# Patient Record
Sex: MALE | Race: WHITE | Employment: OTHER | URBAN - METROPOLITAN AREA
[De-identification: names, ages, dates, MRNs, and addresses within clinical notes are randomized per-mention and may not be internally consistent; named-entity substitution may affect disease eponyms.]

---

## 2020-10-23 ENCOUNTER — HOSPITAL ENCOUNTER (EMERGENCY)
Facility: HOSPITAL | Age: 75
Discharge: HOME | End: 2020-10-24
Attending: EMERGENCY MEDICINE
Payer: MEDICARE

## 2020-10-23 ENCOUNTER — APPOINTMENT (OUTPATIENT)
Dept: CT IMAGING | Facility: HOSPITAL | Age: 75
End: 2020-10-23
Attending: EMERGENCY MEDICINE
Payer: MEDICARE

## 2020-10-23 VITALS
RESPIRATION RATE: 17 BRPM | DIASTOLIC BLOOD PRESSURE: 94 MMHG | OXYGEN SATURATION: 98 % | SYSTOLIC BLOOD PRESSURE: 168 MMHG | BODY MASS INDEX: 32.49 KG/M2 | HEART RATE: 64 BPM | TEMPERATURE: 98 F | HEIGHT: 65 IN | WEIGHT: 195 LBS

## 2020-10-23 DIAGNOSIS — R10.13 ABDOMINAL PAIN, EPIGASTRIC: Primary | ICD-10-CM

## 2020-10-23 PROCEDURE — 83690 ASSAY OF LIPASE: CPT | Performed by: EMERGENCY MEDICINE

## 2020-10-23 PROCEDURE — 99285 EMERGENCY DEPT VISIT HI MDM: CPT

## 2020-10-23 PROCEDURE — S0028 INJECTION, FAMOTIDINE, 20 MG: HCPCS | Performed by: EMERGENCY MEDICINE

## 2020-10-23 PROCEDURE — 80076 HEPATIC FUNCTION PANEL: CPT | Performed by: EMERGENCY MEDICINE

## 2020-10-23 PROCEDURE — 96375 TX/PRO/DX INJ NEW DRUG ADDON: CPT

## 2020-10-23 PROCEDURE — 83605 ASSAY OF LACTIC ACID: CPT | Performed by: EMERGENCY MEDICINE

## 2020-10-23 PROCEDURE — 93005 ELECTROCARDIOGRAM TRACING: CPT

## 2020-10-23 PROCEDURE — 74177 CT ABD & PELVIS W/CONTRAST: CPT | Performed by: EMERGENCY MEDICINE

## 2020-10-23 PROCEDURE — 99284 EMERGENCY DEPT VISIT MOD MDM: CPT

## 2020-10-23 PROCEDURE — 80048 BASIC METABOLIC PNL TOTAL CA: CPT | Performed by: EMERGENCY MEDICINE

## 2020-10-23 PROCEDURE — 80048 BASIC METABOLIC PNL TOTAL CA: CPT

## 2020-10-23 PROCEDURE — 81003 URINALYSIS AUTO W/O SCOPE: CPT | Performed by: EMERGENCY MEDICINE

## 2020-10-23 PROCEDURE — 85025 COMPLETE CBC W/AUTO DIFF WBC: CPT

## 2020-10-23 PROCEDURE — 96374 THER/PROPH/DIAG INJ IV PUSH: CPT

## 2020-10-23 PROCEDURE — 96361 HYDRATE IV INFUSION ADD-ON: CPT

## 2020-10-23 PROCEDURE — 85025 COMPLETE CBC W/AUTO DIFF WBC: CPT | Performed by: EMERGENCY MEDICINE

## 2020-10-23 PROCEDURE — 93010 ELECTROCARDIOGRAM REPORT: CPT | Performed by: EMERGENCY MEDICINE

## 2020-10-23 RX ORDER — MORPHINE SULFATE 4 MG/ML
4 INJECTION, SOLUTION INTRAMUSCULAR; INTRAVENOUS ONCE
Status: COMPLETED | OUTPATIENT
Start: 2020-10-23 | End: 2020-10-23

## 2020-10-23 RX ORDER — FAMOTIDINE 40 MG/1
40 TABLET, FILM COATED ORAL NIGHTLY
Qty: 30 TABLET | Refills: 0 | Status: SHIPPED | OUTPATIENT
Start: 2020-10-23 | End: 2020-11-22

## 2020-10-23 RX ORDER — FAMOTIDINE 10 MG/ML
20 INJECTION, SOLUTION INTRAVENOUS ONCE
Status: COMPLETED | OUTPATIENT
Start: 2020-10-23 | End: 2020-10-23

## 2020-10-24 NOTE — ED NOTES
Assumed care to this pt who came in ambulatory to room 42 from triage for complaints of mid abdominal pain that radiates to the back started last night. Pt denies N/V/D. Pain scale at 8-9/10 pain scale. Pt is A/O x 4, breathing is non labored.   Will con

## 2020-10-24 NOTE — ED INITIAL ASSESSMENT (HPI)
Patient c/o abdominal and back pain since this morning. States he feels bloated and pain radiates to his right side.

## 2020-10-24 NOTE — ED PROVIDER NOTES
Patient Seen in: [facility] AND [facility] Emergency Department      History   Patient presents with:  Abdominal Pain    Stated Complaint: abdominal pain since this morning     HPI  49-year-old male with history of prostate cancer, hypertension, appendectomy, re Triage Vitals [10/23/20 1956]   BP (!) 161/89   Pulse 63   Resp 18   Temp 97.8 °F (36.6 °C)   Temp src Oral   SpO2 96 %   O2 Device None (Room air)       Current:BP (!) 168/94   Pulse 65   Temp 97.8 °F (36.6 °C) (Oral)   Resp 17   Ht 165.1 cm (5' 5\")   Wt -----------         ------                     CBC W/ DIFFERENTIAL[[id number]]                              Final result                 Please view results for these tests on the individual orders.    RAINBOW DRAW BLUE   RAINBOW DRAW LAVENDER   RAIN [address] [place] [address]  [phone]    In 3 days      We recommend that you schedule follow up care with a primary care provider within the next three months to obtain basic health screening including reassessment of your blood pressure.       Medication

## 2021-03-05 ENCOUNTER — LAB ENCOUNTER (OUTPATIENT)
Dept: LAB | Facility: HOSPITAL | Age: 76
End: 2021-03-05
Attending: INTERNAL MEDICINE
Payer: MEDICARE

## 2021-03-05 DIAGNOSIS — Z01.818 PREOP EXAMINATION: ICD-10-CM

## 2021-03-05 DIAGNOSIS — Z11.59 ENCOUNTER FOR SCREENING FOR OTHER VIRAL DISEASES: ICD-10-CM

## 2021-03-05 LAB — SARS-COV-2 RNA RESP QL NAA+PROBE: NOT DETECTED

## 2021-03-08 ENCOUNTER — HOSPITAL ENCOUNTER (OUTPATIENT)
Dept: GENERAL RADIOLOGY | Facility: HOSPITAL | Age: 76
Discharge: HOME | End: 2021-03-08
Attending: INTERNAL MEDICINE
Payer: MEDICARE

## 2021-03-08 DIAGNOSIS — R47.02 DYSPHASIA: ICD-10-CM

## 2021-03-08 PROCEDURE — 74220 X-RAY XM ESOPHAGUS 1CNTRST: CPT | Performed by: INTERNAL MEDICINE

## 2023-06-20 ENCOUNTER — HOSPITAL ENCOUNTER (OUTPATIENT)
Dept: GENERAL RADIOLOGY | Facility: HOSPITAL | Age: 78
Discharge: HOME | End: 2023-06-20
Attending: INTERNAL MEDICINE
Payer: MEDICARE

## 2023-06-20 DIAGNOSIS — M79.672 LEFT FOOT PAIN: ICD-10-CM

## 2023-06-20 PROCEDURE — 73630 X-RAY EXAM OF FOOT: CPT | Performed by: INTERNAL MEDICINE

## 2023-10-12 ENCOUNTER — OFFICE VISIT (OUTPATIENT)
Dept: PHYSICAL MEDICINE AND REHAB | Facility: CLINIC | Age: 78
End: 2023-10-12
Payer: MEDICARE

## 2023-10-12 ENCOUNTER — HOSPITAL ENCOUNTER (OUTPATIENT)
Dept: GENERAL RADIOLOGY | Facility: HOSPITAL | Age: 78
Discharge: HOME | End: 2023-10-12
Attending: PHYSICAL MEDICINE & REHABILITATION
Payer: MEDICARE

## 2023-10-12 VITALS
HEIGHT: 65 IN | WEIGHT: 200 LBS | BODY MASS INDEX: 33.32 KG/M2 | SYSTOLIC BLOOD PRESSURE: 120 MMHG | DIASTOLIC BLOOD PRESSURE: 70 MMHG

## 2023-10-12 DIAGNOSIS — M15.9 GENERALIZED OSTEOARTHRITIS: ICD-10-CM

## 2023-10-12 DIAGNOSIS — M15.9 GENERALIZED OSTEOARTHRITIS: Primary | ICD-10-CM

## 2023-10-12 PROCEDURE — 1159F MED LIST DOCD IN RCRD: CPT | Performed by: PHYSICAL MEDICINE & REHABILITATION

## 2023-10-12 PROCEDURE — 3078F DIAST BP <80 MM HG: CPT | Performed by: PHYSICAL MEDICINE & REHABILITATION

## 2023-10-12 PROCEDURE — 3074F SYST BP LT 130 MM HG: CPT | Performed by: PHYSICAL MEDICINE & REHABILITATION

## 2023-10-12 PROCEDURE — 3008F BODY MASS INDEX DOCD: CPT | Performed by: PHYSICAL MEDICINE & REHABILITATION

## 2023-10-12 PROCEDURE — 1160F RVW MEDS BY RX/DR IN RCRD: CPT | Performed by: PHYSICAL MEDICINE & REHABILITATION

## 2023-10-12 PROCEDURE — 73030 X-RAY EXAM OF SHOULDER: CPT | Performed by: PHYSICAL MEDICINE & REHABILITATION

## 2023-10-12 PROCEDURE — 99204 OFFICE O/P NEW MOD 45 MIN: CPT | Performed by: PHYSICAL MEDICINE & REHABILITATION

## 2023-10-12 RX ORDER — MELOXICAM 15 MG/1
TABLET ORAL
Qty: 30 TABLET | Refills: 0 | Status: SHIPPED | OUTPATIENT
Start: 2023-10-12

## 2023-10-17 ENCOUNTER — HOSPITAL ENCOUNTER (EMERGENCY)
Facility: HOSPITAL | Age: 78
Discharge: HOME | End: 2023-10-18
Attending: EMERGENCY MEDICINE
Payer: MEDICARE

## 2023-10-17 DIAGNOSIS — R10.9 ACUTE LEFT FLANK PAIN: Primary | ICD-10-CM

## 2023-10-17 PROCEDURE — 99285 EMERGENCY DEPT VISIT HI MDM: CPT

## 2023-10-17 PROCEDURE — 96374 THER/PROPH/DIAG INJ IV PUSH: CPT

## 2023-10-17 PROCEDURE — 81003 URINALYSIS AUTO W/O SCOPE: CPT | Performed by: EMERGENCY MEDICINE

## 2023-10-18 ENCOUNTER — APPOINTMENT (OUTPATIENT)
Dept: ULTRASOUND IMAGING | Facility: HOSPITAL | Age: 78
End: 2023-10-18
Attending: EMERGENCY MEDICINE
Payer: MEDICARE

## 2023-10-18 ENCOUNTER — APPOINTMENT (OUTPATIENT)
Dept: CT IMAGING | Facility: HOSPITAL | Age: 78
End: 2023-10-18
Attending: EMERGENCY MEDICINE
Payer: MEDICARE

## 2023-10-18 VITALS
RESPIRATION RATE: 18 BRPM | TEMPERATURE: 97 F | SYSTOLIC BLOOD PRESSURE: 139 MMHG | DIASTOLIC BLOOD PRESSURE: 90 MMHG | OXYGEN SATURATION: 96 % | HEART RATE: 55 BPM

## 2023-10-18 LAB
ALBUMIN SERPL-MCNC: 3.7 G/DL (ref 3.4–5)
ALBUMIN/GLOB SERPL: 1.3 {RATIO} (ref 1–2)
ALP LIVER SERPL-CCNC: 66 U/L
ALT SERPL-CCNC: 27 U/L
ANION GAP SERPL CALC-SCNC: 7 MMOL/L (ref 0–18)
AST SERPL-CCNC: 15 U/L (ref 15–37)
BASOPHILS # BLD AUTO: 0.04 X10(3) UL (ref 0–0.2)
BASOPHILS NFR BLD AUTO: 0.6 %
BILIRUB SERPL-MCNC: 0.5 MG/DL (ref 0.1–2)
BILIRUB UR QL: NEGATIVE
BUN BLD-MCNC: 16 MG/DL (ref 7–18)
BUN/CREAT SERPL: 19 (ref 10–20)
CALCIUM BLD-MCNC: 8.8 MG/DL (ref 8.5–10.1)
CHLORIDE SERPL-SCNC: 102 MMOL/L (ref 98–112)
CLARITY UR: CLEAR
CO2 SERPL-SCNC: 26 MMOL/L (ref 21–32)
CREAT BLD-MCNC: 0.84 MG/DL
DEPRECATED RDW RBC AUTO: 45.5 FL (ref 35.1–46.3)
EGFRCR SERPLBLD CKD-EPI 2021: 88 ML/MIN/1.73M2 (ref 60–?)
EOSINOPHIL # BLD AUTO: 0.04 X10(3) UL (ref 0–0.7)
EOSINOPHIL NFR BLD AUTO: 0.6 %
ERYTHROCYTE [DISTWIDTH] IN BLOOD BY AUTOMATED COUNT: 13.3 % (ref 11–15)
GLOBULIN PLAS-MCNC: 2.8 G/DL (ref 2.8–4.4)
GLUCOSE BLD-MCNC: 104 MG/DL (ref 70–99)
GLUCOSE UR-MCNC: NORMAL MG/DL
HCT VFR BLD AUTO: 37.5 %
HGB BLD-MCNC: 13.4 G/DL
HGB UR QL STRIP.AUTO: NEGATIVE
IMM GRANULOCYTES # BLD AUTO: 0.02 X10(3) UL (ref 0–1)
IMM GRANULOCYTES NFR BLD: 0.3 %
KETONES UR-MCNC: NEGATIVE MG/DL
LEUKOCYTE ESTERASE UR QL STRIP.AUTO: NEGATIVE
LYMPHOCYTES # BLD AUTO: 1.71 X10(3) UL (ref 1–4)
LYMPHOCYTES NFR BLD AUTO: 27.4 %
MCH RBC QN AUTO: 33.2 PG (ref 26–34)
MCHC RBC AUTO-ENTMCNC: 35.7 G/DL (ref 31–37)
MCV RBC AUTO: 92.8 FL
MONOCYTES # BLD AUTO: 0.61 X10(3) UL (ref 0.1–1)
MONOCYTES NFR BLD AUTO: 9.8 %
NEUTROPHILS # BLD AUTO: 3.81 X10 (3) UL (ref 1.5–7.7)
NEUTROPHILS # BLD AUTO: 3.81 X10(3) UL (ref 1.5–7.7)
NEUTROPHILS NFR BLD AUTO: 61.3 %
NITRITE UR QL STRIP.AUTO: NEGATIVE
OSMOLALITY SERPL CALC.SUM OF ELEC: 281 MOSM/KG (ref 275–295)
PH UR: 6.5 [PH] (ref 5–8)
PLATELET # BLD AUTO: 163 10(3)UL (ref 150–450)
POTASSIUM SERPL-SCNC: 3.7 MMOL/L (ref 3.5–5.1)
PROT SERPL-MCNC: 6.5 G/DL (ref 6.4–8.2)
PROT UR-MCNC: NEGATIVE MG/DL
RBC # BLD AUTO: 4.04 X10(6)UL
SODIUM SERPL-SCNC: 135 MMOL/L (ref 136–145)
SP GR UR STRIP: 1.01 (ref 1–1.03)
UROBILINOGEN UR STRIP-ACNC: NORMAL
WBC # BLD AUTO: 6.2 X10(3) UL (ref 4–11)

## 2023-10-18 PROCEDURE — 93975 VASCULAR STUDY: CPT | Performed by: EMERGENCY MEDICINE

## 2023-10-18 PROCEDURE — 76870 US EXAM SCROTUM: CPT | Performed by: EMERGENCY MEDICINE

## 2023-10-18 PROCEDURE — 80053 COMPREHEN METABOLIC PANEL: CPT | Performed by: EMERGENCY MEDICINE

## 2023-10-18 PROCEDURE — 74177 CT ABD & PELVIS W/CONTRAST: CPT | Performed by: EMERGENCY MEDICINE

## 2023-10-18 PROCEDURE — 85025 COMPLETE CBC W/AUTO DIFF WBC: CPT | Performed by: EMERGENCY MEDICINE

## 2023-10-18 RX ORDER — DICYCLOMINE HCL 20 MG
20 TABLET ORAL 4 TIMES DAILY PRN
Qty: 30 TABLET | Refills: 0 | Status: SHIPPED | OUTPATIENT
Start: 2023-10-18 | End: 2023-11-17

## 2023-10-18 RX ORDER — MORPHINE SULFATE 4 MG/ML
4 INJECTION, SOLUTION INTRAMUSCULAR; INTRAVENOUS ONCE
Status: COMPLETED | OUTPATIENT
Start: 2023-10-18 | End: 2023-10-18

## 2023-10-18 NOTE — ED INITIAL ASSESSMENT (HPI)
Pt presents to ed with c/o  left sided abdominal pain that radiates down to his penis that started two hours ago. Denies n/v/d. Pt states the pain started out of nowhere. Pt daughter states she is able to translate, interp declined.

## 2023-10-28 ENCOUNTER — TELEPHONE (OUTPATIENT)
Dept: PHYSICAL MEDICINE AND REHAB | Facility: CLINIC | Age: 78
End: 2023-10-28

## 2023-10-28 NOTE — TELEPHONE ENCOUNTER
----- Message from Son Kimbrough DO sent at 10/17/2023  6:37 AM CDT -----  Xray reviewed, shoulder joint appears normal. Continue with meloxicam and keep f/u appointment.

## 2023-10-28 NOTE — TELEPHONE ENCOUNTER
Called patient used help with translate to speak to patient. Notified him about the xray results and to keep appointment with Doctor Anna Keller. Patient thanked for the call.

## 2023-11-13 ENCOUNTER — OFFICE VISIT (OUTPATIENT)
Dept: PHYSICAL MEDICINE AND REHAB | Facility: CLINIC | Age: 78
End: 2023-11-13
Payer: MEDICARE

## 2023-11-13 VITALS — OXYGEN SATURATION: 98 % | HEART RATE: 59 BPM | HEIGHT: 65 IN | BODY MASS INDEX: 33.32 KG/M2 | WEIGHT: 200 LBS

## 2023-11-13 DIAGNOSIS — M15.9 GENERALIZED OSTEOARTHRITIS: Primary | ICD-10-CM

## 2023-11-13 DIAGNOSIS — M79.18 MYOFASCIAL PAIN: ICD-10-CM

## 2023-11-13 PROCEDURE — 3008F BODY MASS INDEX DOCD: CPT | Performed by: PHYSICAL MEDICINE & REHABILITATION

## 2023-11-13 PROCEDURE — 1126F AMNT PAIN NOTED NONE PRSNT: CPT | Performed by: PHYSICAL MEDICINE & REHABILITATION

## 2023-11-13 PROCEDURE — 1159F MED LIST DOCD IN RCRD: CPT | Performed by: PHYSICAL MEDICINE & REHABILITATION

## 2023-11-13 PROCEDURE — 99214 OFFICE O/P EST MOD 30 MIN: CPT | Performed by: PHYSICAL MEDICINE & REHABILITATION

## 2023-11-13 RX ORDER — MELOXICAM 15 MG/1
15 TABLET ORAL DAILY PRN
Qty: 30 TABLET | Refills: 2 | Status: SHIPPED | OUTPATIENT
Start: 2023-11-13

## 2024-01-02 ENCOUNTER — OFFICE VISIT (OUTPATIENT)
Dept: PHYSICAL MEDICINE AND REHAB | Facility: CLINIC | Age: 79
End: 2024-01-02
Payer: MEDICARE

## 2024-01-02 VITALS — BODY MASS INDEX: 33.32 KG/M2 | OXYGEN SATURATION: 100 % | HEIGHT: 65 IN | WEIGHT: 200 LBS | HEART RATE: 64 BPM

## 2024-01-02 DIAGNOSIS — M15.9 GENERALIZED OSTEOARTHRITIS: ICD-10-CM

## 2024-01-02 DIAGNOSIS — M79.18 MYOFASCIAL PAIN: ICD-10-CM

## 2024-01-02 DIAGNOSIS — M17.0 PRIMARY OSTEOARTHRITIS OF BOTH KNEES: Primary | ICD-10-CM

## 2024-01-02 PROCEDURE — 20611 DRAIN/INJ JOINT/BURSA W/US: CPT | Performed by: PHYSICAL MEDICINE & REHABILITATION

## 2024-01-02 PROCEDURE — 3008F BODY MASS INDEX DOCD: CPT | Performed by: PHYSICAL MEDICINE & REHABILITATION

## 2024-01-02 PROCEDURE — 99214 OFFICE O/P EST MOD 30 MIN: CPT | Performed by: PHYSICAL MEDICINE & REHABILITATION

## 2024-01-02 RX ORDER — TRIAMCINOLONE ACETONIDE 40 MG/ML
40 INJECTION, SUSPENSION INTRA-ARTICULAR; INTRAMUSCULAR ONCE
Status: COMPLETED | OUTPATIENT
Start: 2024-01-02 | End: 2024-01-02

## 2024-01-02 RX ORDER — LIDOCAINE HYDROCHLORIDE 10 MG/ML
3 INJECTION, SOLUTION INFILTRATION; PERINEURAL ONCE
Status: COMPLETED | OUTPATIENT
Start: 2024-01-02 | End: 2024-01-02

## 2024-01-02 NOTE — PROGRESS NOTES
Progress note    C/C:   Chief Complaint   Patient presents with    Follow - Up     LOV 11/13/23 pt is here for a follow up on Generalized osteoarthritis.  Was given injections and states he has gotten injection for a few days. No N/T.  Takes meloxicam to ease the pain. Pain 4/10      HPI: 78 year old male presents for follow up. Has chronic bilateral medial knee pain that worsens with standing and walking, less so with sitting. He walks on a treadmill slowly about 1/2 hour, with mild transient knee pain afterwards. No swelling, mechanical symptoms. He has a known history of osteoarthritis of both knees. Last knee joint injections were about 3 1/2 months ago; he is requesting bilateral knee joint injections. Used to have them done at [place].     He also has right scapular pain, constant. No pain with overhead lifting. Had XR right shoulder    Pertinent allergies:   Allergies   Allergen Reactions    Anesthesia S-I-40 [Kdc:Egg Phospholipids+Sodium Metabisulfite+Soybean Oil+Propofol] OTHER (SEE COMMENTS)     Rash to some anesthesia    Penicillin V SHORTNESS OF BREATH        Physical exam:  Pulse 64   Ht 65\"   Wt 200 lb (90.7 kg)   SpO2 100%   BMI 33.28 kg/m²      bilateral knee exam  Observation:    Range of motion:  Flexion: 110 degrees b/l  Extension: 0 degrees b/l    Palpation:  Medial joint line tenderness: negative b/l  Lateral joint line tenderness: negative b/l  Medial patellar facet tenderness: negative b/l  Lateral patellar facet tenderness: negative b/l    Provocative tests:  Lachman: negative b/l  Valgus and varus stress testing: negative b/l    PE right shoulder exam:    Range of motion:  Forward flexion: 160 degrees  Abduction: 160 degrees  Internal rotation: T7  External rotation: mild restriction to PROM    Imaging: No new imaging to review    Assessment and plan  Primary osteoarthritis of both knees  Myofascial pain, scapular pain    Recommend bilateral knee joint steroid injections; done today, see  separate note for details. Continue meloxicam 15mg qDaily PRN pain. Consider physical therapy, right mid trapezius, rhomboid trigger point injections.     F/u 4 weeks.     Soy Gloria DO  Physical Medicine and Rehabilitation  [facility]

## 2024-01-02 NOTE — PROCEDURES
Dx: primary osteoarthritis of both knees  Procedure: bilateral knee joint steroid injections under US guidance    The patient is here for a bilateral knee injection done under ultrasound guidance.  Under ultrasound guidance the bilateral suprapatellar bursa was identified and a geri was placed on the patient's skin.The skin was cleaned with betadine swabs x3 and anesthetized with ethyl chloride spray.  Then a 25 gauge needle was inserted under ultrasound guidance into the bilateral suprapatellar bursa.  Aspiration was performed and no blood, fluid, or air was aspirated.  The patient was then injected with 4 ml of 1 ml of 40 mg of Kenalog/ml and 3 ml of 1% lidocaine without epinephrine.  The needle was removed and a band aid was applied.  The patient will follow up in 2-3 week(s).    Images were saved of the injections.    Soy Gloria DO  Physical Medicine and Rehabilitation  [facility]

## 2024-01-03 ENCOUNTER — TELEPHONE (OUTPATIENT)
Dept: PHYSICAL MEDICINE AND REHAB | Facility: CLINIC | Age: 79
End: 2024-01-03

## 2024-01-03 NOTE — TELEPHONE ENCOUNTER
Initiated retro authorization for Bilateral knee joint steroid injections under US guidance CPT/[facility] 84805-51,  with [employer]  Status: Approved w/ authorization #[id number] valid 1/2/24-4/30/24      Inj done in office

## 2024-01-30 ENCOUNTER — OFFICE VISIT (OUTPATIENT)
Dept: PHYSICAL MEDICINE AND REHAB | Facility: CLINIC | Age: 79
End: 2024-01-30
Payer: MEDICARE

## 2024-01-30 DIAGNOSIS — M79.18 MYOFASCIAL PAIN: ICD-10-CM

## 2024-01-30 DIAGNOSIS — M17.0 PRIMARY OSTEOARTHRITIS OF BOTH KNEES: Primary | ICD-10-CM

## 2024-01-30 PROBLEM — M25.561 PAIN IN BOTH KNEES: Status: ACTIVE | Noted: 2022-03-31

## 2024-01-30 PROBLEM — M25.519 SHOULDER PAIN: Status: ACTIVE | Noted: 2023-09-22

## 2024-01-30 PROBLEM — M25.511 PAIN IN JOINT OF RIGHT SHOULDER: Status: ACTIVE | Noted: 2023-01-09

## 2024-01-30 PROBLEM — M25.562 PAIN IN BOTH KNEES: Status: ACTIVE | Noted: 2022-03-31

## 2024-01-30 PROCEDURE — 20552 NJX 1/MLT TRIGGER POINT 1/2: CPT | Performed by: PHYSICAL MEDICINE & REHABILITATION

## 2024-01-30 PROCEDURE — 99214 OFFICE O/P EST MOD 30 MIN: CPT | Performed by: PHYSICAL MEDICINE & REHABILITATION

## 2024-01-30 RX ORDER — OMEPRAZOLE 40 MG/1
1 CAPSULE, DELAYED RELEASE ORAL DAILY
COMMUNITY

## 2024-01-30 RX ORDER — LIDOCAINE HYDROCHLORIDE 10 MG/ML
2.5 INJECTION, SOLUTION INFILTRATION; PERINEURAL ONCE
Status: COMPLETED | OUTPATIENT
Start: 2024-01-30 | End: 2024-01-30

## 2024-01-30 RX ORDER — CYCLOBENZAPRINE HCL 5 MG
1 TABLET ORAL NIGHTLY
COMMUNITY

## 2024-01-30 RX ORDER — TRIAMCINOLONE ACETONIDE 40 MG/ML
20 INJECTION, SUSPENSION INTRA-ARTICULAR; INTRAMUSCULAR ONCE
Status: COMPLETED | OUTPATIENT
Start: 2024-01-30 | End: 2024-01-30

## 2024-01-30 RX ORDER — DICYCLOMINE HCL 20 MG
TABLET ORAL
COMMUNITY

## 2024-01-31 ENCOUNTER — TELEPHONE (OUTPATIENT)
Dept: PHYSICAL MEDICINE AND REHAB | Facility: CLINIC | Age: 79
End: 2024-01-31

## 2024-01-31 NOTE — PROGRESS NOTES
Progress note    C/C:   Chief Complaint   Patient presents with    Follow - Up     LOV: 01/02/2024 - bilateral knee CSI. \"They did help a little but not as much as last time.\"   RIGHT posterior shoulder pain. Pt to discuss next plan of care, possible injection. Minimal, constant pain.      HPI: 78-year-old male presents for follow-up.  Not much relief with bilateral knee pain following bilateral knee joint steroid injections under ultrasound guidance.  The left knee in particular has been more painful and more limiting with walking.  He is to take meloxicam 15 mg as needed, about once a week without side effects.    He also continues to have right scapular pain that can be provoked with overhead movements.  No numbness or tingling in the arm or hand, no weakness.  No pain over the shoulder joint.    Pertinent allergies:   Allergies   Allergen Reactions    Anesthesia S-I-40 [Kdc:Egg Phospholipids+Sodium Metabisulfite+Soybean Oil+Propofol] OTHER (SEE COMMENTS)     Rash to some anesthesia    Penicillin V SHORTNESS OF BREATH        Physical exam:  bilateral knee exam  Observation: Appreciable effusions    Range of motion:  Flexion: 110 degrees right, 100 degrees left  Extension: 0 degrees b/l    Palpation:  Medial joint line tenderness: + right, negative left  Lateral joint line tenderness: - right, + left  Medial patellar facet tenderness: negative b/l  Lateral patellar facet tenderness: negative b/l    Provocative tests:  Lachman: negative b/l  Valgus and varus stress testing: negative b/l    Imaging: No new imaging to review    Assessment and plan  Generalized osteoarthritis in multiple joints of the hands, bilateral knees  Myofascial pain syndrome    Recommend left or bilateral durolane or monovisc injections under US guidance; if he decides the right knee is not bothersome enough to warrant further treatment we can defer the right knee injection for a later date. Continue meloxicam intermittently. For the right  scapular pain recommend right rhomboid, mid trapezius trigger point injections; done same day, see separate note for details.     Soy Gloria DO  Physical Medicine and Rehabilitation  [facility]

## 2024-01-31 NOTE — TELEPHONE ENCOUNTER
Initiated authorization for Right rhomboid and mid trapezius trigger point injections CPT/[facility] 55816 () with [employer]  Status: referral Approved w/ authorization #[id number] valid 1/30/24-7/27/24  TPI done in office    And    Initiated authorization for Monovisc or Durolane- bilateral knee joint injections under US guidance  CPT/HCPCS 41952-70, W2576e0,  with at Verna HAMILTON at [facility]  Case #[id number]  Monovisc and Durolane are preferred and authorization is not required per health plan w/ [employer]  Status: referral Approved w/ authorization #[id number] valid 1/30/24-7/27/24

## 2024-01-31 NOTE — PROCEDURES
Procedure note: Trigger point injections  Procedure Diagnosis: Myofascial pain    Summary of Procedure:   Risks and benefits of the procedure were discussed with the patient and consent was obtained. Trigger points were palpated and marked along the right rhomboids and mid/lower trapezius. Using betadine swabs, injection sites were cleaned in sterile fashion. Using a 27 gauge 1 1/2\" needle a total 10ml of 20mg Kenolog diluted into 2.5cc of 1% Lidocaine was injected into the trigger points with 1 ml into each trigger point. There was negative aspiration of heme and no paresthesias were elicited. Patient tolerated the procedure well.

## 2024-02-01 NOTE — TELEPHONE ENCOUNTER
LMTCB to schedule  Durolane- bilateral knee joint injections under US guidance     Durolane or monovisc

## 2024-02-05 NOTE — TELEPHONE ENCOUNTER
LMTCB 3rd attempt    I also called Daughter, Natalee (HIPAA verified) she will call office back to schedule when ready

## 2024-02-15 ENCOUNTER — OFFICE VISIT (OUTPATIENT)
Dept: PHYSICAL MEDICINE AND REHAB | Facility: CLINIC | Age: 79
End: 2024-02-15
Payer: MEDICARE

## 2024-02-15 VITALS — BODY MASS INDEX: 33.32 KG/M2 | WEIGHT: 200 LBS | OXYGEN SATURATION: 99 % | HEART RATE: 66 BPM | HEIGHT: 65 IN

## 2024-02-15 DIAGNOSIS — M17.0 PRIMARY OSTEOARTHRITIS OF BOTH KNEES: Primary | ICD-10-CM

## 2024-02-15 PROCEDURE — 1159F MED LIST DOCD IN RCRD: CPT | Performed by: PHYSICAL MEDICINE & REHABILITATION

## 2024-02-15 PROCEDURE — 1160F RVW MEDS BY RX/DR IN RCRD: CPT | Performed by: PHYSICAL MEDICINE & REHABILITATION

## 2024-02-15 PROCEDURE — 3008F BODY MASS INDEX DOCD: CPT | Performed by: PHYSICAL MEDICINE & REHABILITATION

## 2024-02-15 PROCEDURE — 99213 OFFICE O/P EST LOW 20 MIN: CPT | Performed by: PHYSICAL MEDICINE & REHABILITATION

## 2024-02-15 NOTE — PROGRESS NOTES
Progress note    C/C:   Chief Complaint   Patient presents with    Follow - Up     LOV 01/30/24 pt is here for a follow up on Primary osteoarthritis of both knees. No N/T Currently not taking pain meds.. Pain 2/10      HPI: 78 year old male presents for follow up. Since last visit he has had complete relief of scapular pain. Does continue to have bilateral knee pain, left worse than right, particularly bothersome when ascending stairs, less painful on level ground. Takes meloxicam 1-2 days a week without SE.     Pertinent allergies:   Allergies   Allergen Reactions    Anesthesia S-I-40 [Kdc:Egg Phospholipids+Sodium Metabisulfite+Soybean Oil+Propofol] OTHER (SEE COMMENTS)     Rash to some anesthesia    Penicillin V SHORTNESS OF BREATH        Physical exam:  Pulse 66   Ht 65\"   Wt 200 lb (90.7 kg)   SpO2 99%   BMI 33.28 kg/m²      bilateral knee exam  Observation: No appreciable effusions b/l     Range of motion:  Flexion: 110 degrees right, 100 degrees left  Extension: 0 degrees b/l     Palpation:  Medial joint line tenderness: + right, negative left  Lateral joint line tenderness: - right, + left  Medial patellar facet tenderness: negative b/l  Lateral patellar facet tenderness: negative b/l     Provocative tests:  Lachman: negative b/l  Valgus and varus stress testing: negative b/l    Imaging: No new imaging to review    Assessment and plan  Generalized osteoarthritis in multiple joints of hands, b/l knees  Myofascial pain syndrome, resolved    He is doing well enough where he prefers to hold off on further knee injections for the time being. We did get durolane injections approved and offered him injection today, but he would like to consider this on another date. The knee injections usually wear off for him in 3 1/2 to 4 months; he will follow up in mid-late march and we can consider either repeating steroid injection or doing durolane injections at that time. May continue meloxicam 15mg qDaily PRN pain in  the interim.     Soy Gloria DO  Physical Medicine and Rehabilitation  [facility]

## 2024-03-19 ENCOUNTER — OFFICE VISIT (OUTPATIENT)
Dept: PHYSICAL MEDICINE AND REHAB | Facility: CLINIC | Age: 79
End: 2024-03-19
Payer: MEDICARE

## 2024-03-19 VITALS — OXYGEN SATURATION: 100 % | BODY MASS INDEX: 34 KG/M2 | HEART RATE: 62 BPM | WEIGHT: 205 LBS

## 2024-03-19 DIAGNOSIS — M17.0 PRIMARY OSTEOARTHRITIS OF BOTH KNEES: Primary | ICD-10-CM

## 2024-03-19 PROCEDURE — 1125F AMNT PAIN NOTED PAIN PRSNT: CPT | Performed by: PHYSICAL MEDICINE & REHABILITATION

## 2024-03-19 PROCEDURE — 1159F MED LIST DOCD IN RCRD: CPT | Performed by: PHYSICAL MEDICINE & REHABILITATION

## 2024-03-19 PROCEDURE — 1160F RVW MEDS BY RX/DR IN RCRD: CPT | Performed by: PHYSICAL MEDICINE & REHABILITATION

## 2024-03-19 PROCEDURE — 20611 DRAIN/INJ JOINT/BURSA W/US: CPT | Performed by: PHYSICAL MEDICINE & REHABILITATION

## 2024-03-19 RX ORDER — LIDOCAINE HYDROCHLORIDE 10 MG/ML
4 INJECTION, SOLUTION INFILTRATION; PERINEURAL ONCE
Status: COMPLETED | OUTPATIENT
Start: 2024-03-19 | End: 2024-03-19

## 2024-03-19 NOTE — PROCEDURES
Dx: primary osteoarthritis of both knees  Procedure: bilateral durolane knee joint steroid injections under US guidance    The patient is here for a bilateral knee injection done under ultrasound guidance.  Under ultrasound guidance the bilateral suprapatellar bursa was identified and a geri was placed on the patient's skin.The skin was cleaned with betadine swabs x3 and anesthetized with 2ml of 1% PF lidocaine without epinephrine. Then a 22 gauge needle was inserted under ultrasound guidance into the bilateral suprapatellar bursa.  Aspiration was performed and no blood, fluid, or air was aspirated.  The patient was then injected with durolane.  The needle was removed and a band aid was applied.     Images were saved of the injections.

## 2024-05-28 ENCOUNTER — OFFICE VISIT (OUTPATIENT)
Dept: PHYSICAL MEDICINE AND REHAB | Facility: CLINIC | Age: 79
End: 2024-05-28

## 2024-05-28 VITALS
WEIGHT: 205 LBS | OXYGEN SATURATION: 98 % | HEIGHT: 65 IN | RESPIRATION RATE: 18 BRPM | HEART RATE: 62 BPM | BODY MASS INDEX: 34.16 KG/M2

## 2024-05-28 DIAGNOSIS — M17.0 PRIMARY OSTEOARTHRITIS OF BOTH KNEES: Primary | ICD-10-CM

## 2024-05-28 DIAGNOSIS — M15.9 GENERALIZED OSTEOARTHRITIS: ICD-10-CM

## 2024-05-28 PROCEDURE — 3008F BODY MASS INDEX DOCD: CPT | Performed by: PHYSICAL MEDICINE & REHABILITATION

## 2024-05-28 PROCEDURE — 1160F RVW MEDS BY RX/DR IN RCRD: CPT | Performed by: PHYSICAL MEDICINE & REHABILITATION

## 2024-05-28 PROCEDURE — 1159F MED LIST DOCD IN RCRD: CPT | Performed by: PHYSICAL MEDICINE & REHABILITATION

## 2024-05-28 PROCEDURE — 20611 DRAIN/INJ JOINT/BURSA W/US: CPT | Performed by: PHYSICAL MEDICINE & REHABILITATION

## 2024-05-28 PROCEDURE — 99214 OFFICE O/P EST MOD 30 MIN: CPT | Performed by: PHYSICAL MEDICINE & REHABILITATION

## 2024-05-28 RX ORDER — LIDOCAINE HYDROCHLORIDE 10 MG/ML
6 INJECTION, SOLUTION INFILTRATION; PERINEURAL ONCE
Status: COMPLETED | OUTPATIENT
Start: 2024-05-28 | End: 2024-05-28

## 2024-05-28 RX ORDER — TRIAMCINOLONE ACETONIDE 40 MG/ML
80 INJECTION, SUSPENSION INTRA-ARTICULAR; INTRAMUSCULAR ONCE
Status: COMPLETED | OUTPATIENT
Start: 2024-05-28 | End: 2024-05-28

## 2024-05-28 RX ORDER — MELOXICAM 15 MG/1
15 TABLET ORAL DAILY PRN
Qty: 30 TABLET | Refills: 2 | Status: SHIPPED | OUTPATIENT
Start: 2024-05-28

## 2024-05-28 NOTE — PROGRESS NOTES
Progress note    C/C:   Chief Complaint   Patient presents with    Follow - Up     Pt is F/U after bilateral gel injection of the knees, Pt states that he is able to walk due to the injection, states that he is still having pain every day, Denies N.T. States that it is hard to keep weight on knees, Pain 5/10      HPI: 78 year old male presents for follow up. Some improvement with gel injection; able to walk where he could not walk much earlier, though still having bilateral knee pain with walking, left worse than right. Takes meloxicam daily about 1-2 times a week; was told by his PCP he can take the medication up to every other day but is afraid of side effects it may cause if he takes the medication regularly.     Pertinent allergies:   Allergies   Allergen Reactions    Anesthesia S-I-40 [Kdc:Egg Phospholipids+Sodium Metabisulfite+Soybean Oil+Propofol] OTHER (SEE COMMENTS)     Rash to some anesthesia    Penicillin V SHORTNESS OF BREATH        Physical exam:  Pulse 62   Resp 18   Ht 65\"   Wt 205 lb (93 kg)   SpO2 98%   BMI 34.11 kg/m²      bilateral knee exam  Observation: hypertrophy of both knees, left much moreso than right    Range of motion:  Flexion: 110 degrees right, 100 degrees left  Extension: 0 degrees b/l    Palpation:  Medial joint line tenderness: negative b/l  Lateral joint line tenderness: negative b/l  Medial patellar facet tenderness: negative b/l  Lateral patellar facet tenderness: negative b/l    Provocative tests:  Lachman: negative b/l  Valgus and varus stress testing: negative b/l    Imaging: No new imaging to review    Assessment and plan  Primary osteoarthritis of both knees  Prostate CA  Gout  HTN    We discussed either having him take meloxicam 15mg every other day for a week or two, or doing bilateral knee joint steroid injections under US guidance. He does not want to take the medication more often, and elects for bilateral knee joint steroid injections under US guidance. Done  today, see separate note for details.     F/u 3-4 months.     Soy Gloria DO  Physical Medicine and Rehabilitation  [facility]

## 2024-05-28 NOTE — PROCEDURES
Dx: primary osteoarthritis of both knees  Procedure: bilateral knee joint steroid injections under US guidance    The patient is here for a bilateral knee injection done under ultrasound guidance.  Under ultrasound guidance the bilateral suprapatellar bursa was identified and a geri was placed on the patient's skin.The skin was cleaned with betadine swabs x3 and anesthetized with ethyl chloride spray.  Then a 22 gauge needle was inserted under ultrasound guidance into the bilateral suprapatellar bursa.  Aspiration was performed and no blood, fluid, or air was aspirated.  The patient was then injected with 4 ml of 1 ml of 40 mg of Kenalog/ml and 3 ml of 1% lidocaine without epinephrine.  The needle was removed and a band aid was applied.     Images were saved of the injections.    Soy Gloria DO  Physical Medicine and Rehabilitation  [facility]

## 2024-05-29 ENCOUNTER — TELEPHONE (OUTPATIENT)
Dept: PHYSICAL MEDICINE AND REHAB | Facility: CLINIC | Age: 79
End: 2024-05-29

## 2024-05-29 NOTE — TELEPHONE ENCOUNTER
Initiated  Bilateral knee joint steroid injections under US guidance CPT Code: 25467,  & Dx: M17.0 with [employer]    Unable to process, due to referral being needed.

## 2024-06-13 ENCOUNTER — TELEPHONE (OUTPATIENT)
Dept: PHYSICAL MEDICINE AND REHAB | Facility: CLINIC | Age: 79
End: 2024-06-13

## 2024-07-23 ENCOUNTER — HOSPITAL ENCOUNTER (OUTPATIENT)
Dept: MRI IMAGING | Age: 79
Discharge: HOME | End: 2024-07-23
Attending: INTERNAL MEDICINE
Payer: MEDICARE

## 2024-07-23 DIAGNOSIS — R51.9 HEADACHE, UNSPECIFIED: ICD-10-CM

## 2024-07-23 PROCEDURE — 70551 MRI BRAIN STEM W/O DYE: CPT | Performed by: INTERNAL MEDICINE

## 2024-09-03 ENCOUNTER — TELEPHONE (OUTPATIENT)
Dept: PHYSICAL MEDICINE AND REHAB | Facility: CLINIC | Age: 79
End: 2024-09-03

## 2024-09-03 ENCOUNTER — OFFICE VISIT (OUTPATIENT)
Dept: PHYSICAL MEDICINE AND REHAB | Facility: CLINIC | Age: 79
End: 2024-09-03
Payer: MEDICARE

## 2024-09-03 DIAGNOSIS — M17.0 PRIMARY OSTEOARTHRITIS OF BOTH KNEES: Primary | ICD-10-CM

## 2024-09-03 PROCEDURE — 1160F RVW MEDS BY RX/DR IN RCRD: CPT | Performed by: PHYSICAL MEDICINE & REHABILITATION

## 2024-09-03 PROCEDURE — 20611 DRAIN/INJ JOINT/BURSA W/US: CPT | Performed by: PHYSICAL MEDICINE & REHABILITATION

## 2024-09-03 PROCEDURE — 1159F MED LIST DOCD IN RCRD: CPT | Performed by: PHYSICAL MEDICINE & REHABILITATION

## 2024-09-03 RX ORDER — TRIAMCINOLONE ACETONIDE 40 MG/ML
80 INJECTION, SUSPENSION INTRA-ARTICULAR; INTRAMUSCULAR ONCE
Status: COMPLETED | OUTPATIENT
Start: 2024-09-03 | End: 2024-09-03

## 2024-09-03 RX ORDER — LIDOCAINE HYDROCHLORIDE 10 MG/ML
6 INJECTION, SOLUTION INFILTRATION; PERINEURAL ONCE
Status: COMPLETED | OUTPATIENT
Start: 2024-09-03 | End: 2024-09-03

## 2024-09-03 NOTE — PROCEDURES
Worsening bilateral knee pain, left worse than the right, with crepitance of the left knee.  Range of motion reasonably well-maintained.  Knee flexion 110 degrees bilaterally, endrange pain bilaterally.  Extension 0 degrees bilaterally.  No medial or lateral joint and tenderness.  Takes meloxicam 15 mg daily on an as-needed basis, not every day.  Will go ahead and do bilateral knee joint steroid injections today, see below for details.    Dx: primary osteoarthritis of both knees  Procedure: bilateral knee joint steroid injections under US guidance.     The patient is here for a bilateral knee injection done under ultrasound guidance.  Under ultrasound guidance the bilateral suprapatellar bursa was identified and a geri was placed on the patient's skin.The skin was cleaned with betadine swabs x3 and anesthetized with ethyl chloride spray.  Then a 22 gauge needle was inserted under ultrasound guidance into the bilateral suprapatellar bursa.  Aspiration was performed and no blood, fluid, or air was aspirated.  The patient was then injected with 4 ml of 1 ml of 40 mg of Kenalog/ml and 3 ml of 1% lidocaine without epinephrine.  The needle was removed and a band aid was applied.  The patient will follow up in 3-4 week(s).    Images were saved of the injections.    Soy Gloria DO  Physical Medicine and Rehabilitation  [facility]

## 2024-09-03 NOTE — TELEPHONE ENCOUNTER
Initiated authorization for Bilateral knee joint steroid injections under US guidance. CPT/[facility]S 71625-27,  x's 2 dx:M17.0 with [employer]  Completed in the office today.    Status: Approved-Authorization is not required based on medical necessity when being performed, however is not a guarantee of payment and may be subject to review once claim is submitted-Covered Benefit

## 2024-12-19 ENCOUNTER — OFFICE VISIT (OUTPATIENT)
Dept: PHYSICAL MEDICINE AND REHAB | Facility: CLINIC | Age: 79
End: 2024-12-19
Payer: MEDICARE

## 2024-12-19 ENCOUNTER — TELEPHONE (OUTPATIENT)
Dept: PHYSICAL MEDICINE AND REHAB | Facility: CLINIC | Age: 79
End: 2024-12-19

## 2024-12-19 VITALS — BODY MASS INDEX: 34 KG/M2 | HEIGHT: 65 IN

## 2024-12-19 DIAGNOSIS — M15.9 GENERALIZED OSTEOARTHRITIS: ICD-10-CM

## 2024-12-19 DIAGNOSIS — M17.0 PRIMARY OSTEOARTHRITIS OF BOTH KNEES: Primary | ICD-10-CM

## 2024-12-19 RX ORDER — MELOXICAM 15 MG/1
15 TABLET ORAL DAILY PRN
Qty: 30 TABLET | Refills: 2 | Status: SHIPPED | OUTPATIENT
Start: 2024-12-19

## 2024-12-19 RX ORDER — LIDOCAINE HYDROCHLORIDE 10 MG/ML
6 INJECTION, SOLUTION INFILTRATION; PERINEURAL ONCE
Status: COMPLETED | OUTPATIENT
Start: 2024-12-19 | End: 2024-12-19

## 2024-12-19 RX ORDER — TRIAMCINOLONE ACETONIDE 40 MG/ML
80 INJECTION, SUSPENSION INTRA-ARTICULAR; INTRAMUSCULAR ONCE
Status: COMPLETED | OUTPATIENT
Start: 2024-12-19 | End: 2024-12-19

## 2024-12-19 NOTE — PROCEDURES
79-year-old male presents for follow-up.  Worsening bilateral knee pain with standing and walking.  Underwent steroid injection in September.  Requesting repeat.  Done today, see below for details.  Takes meloxicam 15 mg daily on an as-needed basis, not every day.  Medication refilled for him today.    Dx: primary osteoarthritis of both knees  Procedure: bilateral knee joint steroid injections under US guidance    The patient is here for a bilateral knee injection done under ultrasound guidance.  Under ultrasound guidance the bilateral suprapatellar bursa was identified and a geri was placed on the patient's skin.The skin was cleaned with betadine swabs x3 and anesthetized with ethyl chloride spray.  Then a 25 gauge needle was inserted under ultrasound guidance into the bilateral suprapatellar bursa.  Aspiration was performed and no blood, fluid, or air was aspirated.  The patient was then injected with 3 ml of 1 ml of 40 mg of Kenalog/ml and 2 ml of 1% lidocaine without epinephrine.  The needle was removed and a band aid was applied.  The patient will follow up in 3-4 months(s).    Images were saved of the injections.    Soy Gloria DO  Physical Medicine and Rehabilitation  [facility]

## 2024-12-19 NOTE — TELEPHONE ENCOUNTER
Initiated authorization for Bilateral knee joint steroid injections under US guidance. CPT/HCPCS 66087-13,  x's 2 dx:M15.9 with [employer].  Completed in the office today.    Status: Approved - no auth required    Authorization is not required based on medical necessity, however, is not a guarantee of payment and may be subject to review once claim is submitted-Covered Benefit.

## 2024-12-19 NOTE — PROGRESS NOTES
Progress note    C/C:   Chief Complaint   Patient presents with    Follow - Up     LOV 9/3/24 for bilateral knee joint steroid injections under US guidance. The injections helped for a while but the improvement wore off. Pain is low currently. Admits N/T. Denies weakness. Pt can't remember his pain medications. No tx.       HPI: ***    Pertinent allergies: Allergies[1]     Physical exam:  Ht 65\"   BMI 34.11 kg/m²      ***    Imaging: ***    Assessment and plan  ***       [1]   Allergies  Allergen Reactions    Anesthesia S-I-40 [Kdc:Egg Phospholipids+Sodium Metabisulfite+Soybean Oil+Propofol] OTHER (SEE COMMENTS)     Rash to some anesthesia    Penicillin V SHORTNESS OF BREATH

## 2025-04-15 NOTE — PROCEDURES
Dx: Primary osteoarthritis of both knees  Procedure: bilateral knee joint steroid injections under US guidance    The patient is here for a bilateral knee injection done under ultrasound guidance.  Under ultrasound guidance the bilateral suprapatellar bursa was identified and a geri was placed on the patient's skin.The skin was cleaned with betadine swabs x3 and anesthetized with ethyl chloride spray.  Then a 25 gauge needle was inserted under ultrasound guidance into the bilateral suprapatellar bursa.  Aspiration was performed and no blood, fluid, or air was aspirated.  The patient was then injected with 3 ml of 1 ml of 40 mg of Kenalog/ml and 2 ml of 1% lidocaine without epinephrine.  The needle was removed and a band aid was applied.  The patient will follow up in 2-3 week(s).    Images were saved of the injections.    Soy Gloria DO  Physical Medicine and Rehabilitation  [facility]

## 2025-04-15 NOTE — TELEPHONE ENCOUNTER
Initiated authorization for Bilateral knee joint steroid injections under US guidance. CPT/HCPCS 17420-65,  x's 2 dx:M17.0 with [employer] portal.  Completed in the office today.    Status: No auth required    Authorization is not required based on medical necessity, however, is not a guarantee of payment and may be subject to review once claim is submitted.

## 2025-04-15 NOTE — PROGRESS NOTES
The following individual(s) verbally consented to be recorded using ambient AI listening technology and understand that they can each withdraw their consent to this listening technology at any point by asking the clinician to turn off or pause the recording:    Patient name: Sarai Drake

## 2025-04-15 NOTE — PROGRESS NOTES
Progress note    C/C:   Chief Complaint   Patient presents with    Follow - Up     LOV 12/19/2024. Pt is here for f/u on BL knee joint CSI completed 12/19/24 and reports no improvement. Pain is 10/10. Admits N/T and weakness. Denies antibiotics.       HPI: 79 year old male presents for follow up. Worsening bilateral knee pain, diffculty walking. Over the past 1-2 weeks has also had right buttock and lateral hip pain with radiation along the lateral thigh to the knee. No n/t. No previous right hip or low back disorders.     Pertinent allergies: Allergies[1]     Physical exam:  /80 (BP Location: Right arm, Patient Position: Sitting, Cuff Size: large)   Pulse 66   Ht 65\"   Wt 205 lb (93 kg)   SpO2 99%   BMI 34.11 kg/m²      Right hip exam:    Hip flexion: 110 degrees  Hip adduction: 10 degrees  Hip abduction: 30 degrees, no pain  Limitation to internal rotation with provocation of pain: +  Straight leg raise: -    Imaging: no new imaging to review    Assessment and plan  Right buttock/lateral hip pain. Right hip OA vs lumbar radiculopathy  Primary osteoarthritis of both knees    Recommend x-rays of the right hip to include weightbearing views; has been sometime since he has had imaging of the knees, I also ordered x-rays of both knees as well.  This should also include weightbearing views.  We went ahead and repeated bilateral knee joint steroid injections under ultrasound guidance; see separate note for details.    If the right buttock/thigh pain persists he should follow up again in 2-3 weeks; otherwise I will see him in 3 months to repeat the knee joint steroid injections under US guidance if needed.     Soy Gloria DO  Physical Medicine and Rehabilitation  [facility]       [1]   Allergies  Allergen Reactions    Anesthesia S-I-40 [Kdc:Egg Phospholipids+Sodium Metabisulfite+Soybean Oil+Propofol] OTHER (SEE COMMENTS)     Rash to some anesthesia    Penicillin V SHORTNESS OF BREATH

## 2025-04-22 NOTE — TELEPHONE ENCOUNTER
Per Dr Gloria \"Xrays reviewed, quite mild findings of osteoarthritis in the right hip. The xrays of the knees were not done; please have him do that sometime prior to next visit. Would be best to relay this with    or by Italian speaking staff. \"    Language Line services used  : Chung  ID# [id number]    Patient was informed on Dr Gloria's recommendations. No further questions at this time. Closing the encounter.

## 2025-05-06 NOTE — TELEPHONE ENCOUNTER
Initiated authorization for Right trochanteric bursa steroid injection under US guidance. CPT/HCPCS 51327,  dx:M70.61 with [employer] portal.    Status: No auth required    Authorization is not required based on medical necessity, however, is not a guarantee of payment and may be subject to review once claim is submitted.

## 2025-05-06 NOTE — PROGRESS NOTES
The following individual(s) verbally consented to be recorded using ambient AI listening technology and understand that they can each withdraw their consent to this listening technology at any point by asking the clinician to turn off or pause the recording:    Patient name: Jose M Drake    Progress note    C/C:   Chief Complaint   Patient presents with    Follow - Up     LOV 4/15/2025 F/U bilateral knee injection done under ultrasound guidance.patient reports some improvement since injections no N/T c/o pain and weakness takes Meloxicam 15 MG Oral for pain         History of Present Illness  Jose M Drake is an 79-year-old male who presents with hip pain.    He experiences pain in the hip, particularly the right buttock and lateral hip, which worsens when sitting in a car. The pain is less severe when standing or walking but becomes bothersome when sitting. A burning sensation is present in the back of the leg and thigh, specifically in the thick part of the leg rather than the bone.    He uses meloxicam for his knees, taking it every other day due to concerns about stomach issues. No numbness or tingling in the back or leg.    Pertinent allergies: Allergies[1]     Physical exam:  /80   Pulse 63   Resp 16   Ht 65\"   Wt 174 lb 9.6 oz (79.2 kg)   SpO2 97%   BMI 29.05 kg/m²      Right hip exam:    Hip flexion: 110 degrees  Hip adduction: 10 degrees  Hip abduction: 40 degrees  Hip scour: mildly painful  Straight leg raise: -    IMAGING: X-ray right hip dated 4/15/2025 independently reviewed, as was report.  He has very little findings of osteoarthritis of the right hip.  Assessment & Plan  Right trochanteric bursitis, vs lumbar radiculitis  Right hip bursitis is causing pain due to bursa inflammation, with X-rays showing minimal osteoarthritis of the hip joint. Physical therapy is not preferred due to knee pain, so a bursa injection is considered as an alternative treatment. Continue  meloxicam every other day to minimize gastrointestinal issues. Recommend right trochanteric bursa steroid injection under US guidance.     Primary osteoarthritis of both knees    He manages knee pain with meloxicam every other day due to gastrointestinal concerns. Continue this regimen to minimize gastrointestinal issues.    Soy Gloria DO  Physical Medicine and Rehabilitation  [facility]         [1]   Allergies  Allergen Reactions    Anesthesia S-I-40 [Kdc:Egg Phospholipids+Sodium Metabisulfite+Soybean Oil+Propofol] OTHER (SEE COMMENTS)     Rash to some anesthesia    Penicillin V SHORTNESS OF BREATH

## 2025-05-08 NOTE — PROCEDURES
Procedure Note: Right trochanteric bursa injection  Diagnosis: right trochanteric bursitis    After consent was obtained, patient was placed on table in the lateral recumbant position with the right side up.  Area of interest was identified under ultrasound guidance, in particular the sub-gluteal bursa between the gluteus chalino and medius insertions on the greater trochanter, and the overlying soft tissue was prepped using iodine swab in a sterile manner.  Next Ethyl Chloride spray was used at needle insertion point to numb skin. Next using a 25 gauge 1 1/2 inch needle the trochanter bursa was approached and injected a total of 40mg Kenolog in 4cc of 1% Lidocaine with negative aspiration for heme and no paresthesias being elicited.  Patient tolerated procedure well. Image was saved of the injection.  Soy Gloria DO  Physical Medicine and Rehabilitation  [facility]

## 2025-07-15 NOTE — TELEPHONE ENCOUNTER
Initiated authorization for Bilateral Knee Corticosteroid Injections under Ultrasound Guidance. CPT/HCPCS 27458-41,  x's 2 dx:M17.0 with [employer] portal.  Complete din the office today.    Status: No auth required    Authorization is not required based on medical necessity, however, is not a guarantee of payment and may be subject to review once claim is submitted.

## 2025-07-16 NOTE — PROCEDURES
80 year old male presents for follow up; right hip much better following right trochanteric bursa injection under US guidance. Recurrent bilateral knee pain without swelling. Has been taking meloxicam 15mg qDaily PRN pain, not every day. Requesting repeat bilateral knee joint steroid injections; done today, see below.     Dx: primary osteoarthritis of both knees  Procedure: bilateral knee joint steroid injection under US guidance    The patient is here for a bilateral knee injection done under ultrasound guidance.  Under ultrasound guidance the bilateral suprapatellar bursa was identified and a geri was placed on the patient's skin.The skin was cleaned with betadine swabs x3 and anesthetized with ethyl chloride spray.  Then a 25 gauge needle was inserted under ultrasound guidance into the bilateral suprapatellar bursa.  Aspiration was performed and no blood, fluid, or air was aspirated.  The patient was then injected with 4 ml of 1 ml of 40 mg of Kenalog/ml and 3 ml of 1% lidocaine without epinephrine.  The needle was removed and a band aid was applied.  The patient will follow up in 3-4(s).    Images were saved of the injections.    Soy Gloria DO  Physical Medicine and Rehabilitation  [facility]

## (undated) NOTE — LETTER
Notifier: Tanja Yorder       Patient Name: Sarai Drake       Identification Number: [id number]                          Advance Beneficiary Notice of Noncoverage (ABN)   NOTE:  If Medicare doesn’t pay for D. Items/service(s) below, you may have to pay.  Medicare does not pay for everything, even some care that you or your health care provider have good reason to think you need. We expect Medicare may not pay for the D. items/service(s) below.  Items or Services Reason Medicare May Not Pay: Estimated Cost   __EKG ($129.00)  __Pap smear ($48.23) __Pelvic/Breast ($65.00)  __ Ear Irrigation ($149)  __ Injection(s)  ___ Tdap ($70)       ___ Meningitis ($206)   __Prevnar ($285)  ___ Td ($51)            ___Shingrix ($215)        __Prevnar 20 ($309)  ___ Hep A ($156)   ___Prolia ($1827.00)     __ Xiaflex ($              )   ___ Hep B ($167)      __Pneumovax ($155)                                            ___ Vaccine Administration ($31)   __ Medicare does not cover this service      __ Medicare may not pay for this   item/service for your condition     __ Medicare may not pay for this item/service as often as this        WHAT YOU NEED TO DO NOW:  Read this notice, so you can make an informed decision about your care.  Ask us any questions that you may have after you finish reading.  Choose an option below about whether to receive the D. item/service(s)  listed above.  Note: If you choose Option 1 or 2, we may help you to use any other insurance that you might have, but Medicare cannot require us to do this.  OPTIONS: Check only one box.  We cannot choose a box for you.   OPTION 1. I want the D. item/service(s) listed above. You may ask to be paid now, but I also want Medicare billed for an official decision on payment, which is sent to me on a Medicare Summary Notice (MSN). I understand that if Medicare doesn’t pay, I am responsible for payment, but I can appeal to Medicare by following the directions on the  MSN. If Medicare does pay, you will refund any payments I made to you, less co-pays or deductibles.  OPTION 2. I want the D. item/service(s) listed above, but do not bill Medicare. You may ask to be paid now as I am responsible for payment. I cannot appeal if Medicare is not billed.  OPTION 3. I don't want the D. item/service(s) listed above. I understand with this choice I am not responsible for payment, and I cannot appeal to see if Medicare would pay.    H. Additional Information:    This notice gives our opinion, not an official Medicare decision. If you have other questions on this notice or Medicare billing, call 1-800-MEDICARE (1-730.878.8617/TTY: [phone]). Signing below means that you have received and understand this notice. You also receive a copy.  Signature: Date:       You have the right to get Medicare information in an accessible format, like large print, Braille, or audio. You also have the right to file a complaint if you feel you’ve been discriminated against. Visit Medicare.gov/about- us/whdianpnhxwwf-svctvqtedqjmyvryt-bzerss.  According to the Paperwork Reduction Act of 1995, no persons are required to respond to a collection of information unless it displays a valid OMB control number. The valid OMB control number for this information collection is 4249-8412. The time required to complete this information collection is estimated to average 7 minutes per response, including the time to review instructions, search existing data resources, gather the data needed, and complete and review the information collection. If you have comments concerning the accuracy of the time estimate or suggestions for improving this form, please write to: CMS, [facility]     Murphy, Attn: YAYA Reports Clearance Officer, [place], [place] [address].  Form CMS-R-131 (Exp. 1/31/2026) Form Approved OMB No. [id number]

## (undated) NOTE — LETTER
Notifier: [facility]       Patient Name: Sarai Drake       Identification Number: [id number]                          Advance Beneficiary Notice of Noncoverage (ABN)   NOTE:  If Medicare doesn’t pay for D. Items/service(s) below, you may have to pay.  Medicare does not pay for everything, even some care that you or your health care provider have good reason to think you need. We expect Medicare may not pay for the D. items/service(s) below.  Items or Services   Bilateral knee joint steroid injections under US guidance    Reason Medicare May Not Pay: Estimated Cost   __EKG ($129.00)  __Pap smear ($48.23) __Pelvic/Breast ($65.00)  __ Ear Irrigation ($149)  _x_ Injection(s)  ___ Tdap ($70)       ___ Meningitis ($206)   __Prevnar ($285)  ___ Td ($51)            ___Shingrix ($215)        __Prevnar 20 ($309)  ___ Hep A ($156)   ___Prolia ($1827.00)     __ Xiaflex ($              )   ___ Hep B ($167)      __Pneumovax ($155)                                            ___ Vaccine Administration ($31)   __ Medicare does not cover this service      __ Medicare may not pay for this   item/service for your condition     __ Medicare may not pay for this item/service as often as this        WHAT YOU NEED TO DO NOW:  Read this notice, so you can make an informed decision about your care.  Ask us any questions that you may have after you finish reading.  Choose an option below about whether to receive the D. item/service(s)  listed above.  Note: If you choose Option 1 or 2, we may help you to use any other insurance that you might have, but Medicare cannot require us to do this.  OPTIONS: Check only one box.  We cannot choose a box for you.   OPTION 1. I want the D. item/service(s) listed above. You may ask to be paid now, but I also want Medicare billed for an official decision on payment, which is sent to me on a Medicare Summary Notice (MSN). I understand that if Medicare doesn’t pay, I am responsible for  payment, but I can appeal to Medicare by following the directions on the MSN. If Medicare does pay, you will refund any payments I made to you, less co-pays or deductibles.  OPTION 2. I want the D. item/service(s) listed above, but do not bill Medicare. You may ask to be paid now as I am responsible for payment. I cannot appeal if Medicare is not billed.  OPTION 3. I don't want the D. item/service(s) listed above. I understand with this choice I am not responsible for payment, and I cannot appeal to see if Medicare would pay.    H. Additional Information:    This notice gives our opinion, not an official Medicare decision. If you have other questions on this notice or Medicare billing, call 1-800-MEDICARE (1-967.125.3594/TTY: [phone]). Signing below means that you have received and understand this notice. You also receive a copy.  Signature: Date:       You have the right to get Medicare information in an accessible format, like large print, Braille, or audio. You also have the right to file a complaint if you feel you’ve been discriminated against. Visit Medicare.gov/about- us/grblmnmdgdzqm-qmpjvzpzxhdponkjk-trhgpa.  According to the Paperwork Reduction Act of 1995, no persons are required to respond to a collection of information unless it displays a valid OMB control number. The valid OMB control number for this information collection is [id number]. The time required to complete this information collection is estimated to average 7 minutes per response, including the time to review instructions, search existing data resources, gather the data needed, and complete and review the information collection. If you have comments concerning the accuracy of the time estimate or suggestions for improving this form, please write to: CMS, [facility] [facility] [employer]     Murphy Attn: YAYA Reports Clearance Officer, [place], [place] [address].  Form CMS-R-131 (Exp. 1/31/2026) Form Approved OMB No. [id number]

## (undated) NOTE — LETTER
AUTHORIZATION FOR SURGICAL OPERATION OR OTHER PROCEDURE    1. I hereby authorize Dr. Soy Gloria and the [facility] staff assigned to my case to perform the following operation and/or procedure at the [facility]:    Bilateral knee joint steroid injections under US guidance     2.  My physician has explained the nature and purpose of the operation or other procedure, possible alternative methods of treatment, the risks involved, and the possibility of complication to me.  I acknowledge that no guarantee has been made as to the result that may be obtained.  3.  I recognize that, during the course of this operation, or other procedure, unforseen conditions may necessitate additional or different procedure than those listed above.  I, therefore, further authorize and request that the above named physician, his/her physician assistants or designees perform such procedures as are, in his/her professional opinion, necessary and desirable.  4.  Any tissue or organs removed in the operation or other procedure may be disposed of by and at the discretion of the [facility] staff and [facility].  5.  I understand that in the event of a medical emergency, I will be transported by local paramedics to [facility] or other hospital emergency department.  6.  I certify that I have read and fully understand the above consent to operation and/or other procedure.    7.  I acknowledge that my physician has explained sedation/analgesia administration to me including the risks and benefits.  I consent to the administration of sedation/analgesia as may be necessary or desirable in the judgement of my physician.    Witness signature: ___________________________________________________ Date:  ______/______/_____                    Time:  ________ A.M.  P.M.       Patient Name:  Sarai Drake  6/3/1945  [id number]         Patient signature:   ___________________________________________________        Statement of Physician  My signature below affirms that prior to the time of the procedure, I have explained to the patient and/or his/her guardian, the risks and benefits involved in the proposed treatment and any reasonable alternative to the proposed treatment.  I have also explained the risks and benefits involved in the refusal of the proposed treatment and have answered the patient's questions.                        Date:  ______/______/_______  Provider                      Signature:  __________________________________________________________       Time:  ___________ AWILMAN FULTON

## (undated) NOTE — LETTER
AUTHORIZATION FOR SURGICAL OPERATION OR OTHER PROCEDURE    1. I hereby authorize Dr. Soy Gloria and the [facility] staff assigned to my case to perform the following operation and/or procedure at the [facility]:    Right trochanteric bursa steroid injection under US guidance       2.  My physician has explained the nature and purpose of the operation or other procedure, possible alternative methods of treatment, the risks involved, and the possibility of complication to me.  I acknowledge that no guarantee has been made as to the result that may be obtained.  3.  I recognize that, during the course of this operation, or other procedure, unforseen conditions may necessitate additional or different procedure than those listed above.  I, therefore, further authorize and request that the above named physician, his/her physician assistants or designees perform such procedures as are, in his/her professional opinion, necessary and desirable.  4.  Any tissue or organs removed in the operation or other procedure may be disposed of by and at the discretion of the [facility] staff and [facility].  5.  I understand that in the event of a medical emergency, I will be transported by local paramedics to [facility] or other hospital emergency department.  6.  I certify that I have read and fully understand the above consent to operation and/or other procedure.    7.  I acknowledge that my physician has explained sedation/analgesia administration to me including the risks and benefits.  I consent to the administration of sedation/analgesia as may be necessary or desirable in the judgement of my physician.    Witness signature: ___________________________________________________ Date:  ______/______/_____                    Time:  ________ A.M.  P.M.       Jose M Escontrias  6/3/1945  [id number]         Patient signature:   ___________________________________________________                Statement of Physician  My signature below affirms that prior to the time of the procedure, I have explained to the patient and/or his/her guardian, the risks and benefits involved in the proposed treatment and any reasonable alternative to the proposed treatment.  I have also explained the risks and benefits involved in the refusal of the proposed treatment and have answered the patient's questions.                        Date:  ______/______/_______  Provider                      Signature:  __________________________________________________________       Time:  ___________ AWILMAN FULTON

## (undated) NOTE — LETTER
AUTHORIZATION FOR SURGICAL OPERATION OR OTHER PROCEDURE    1. I hereby authorize Dr. Soy Gloria and the [facility] staff assigned to my case to perform the following operation and/or procedure at the [facility]:    Bilateral knee joint steroid injections under US guidance     2.  My physician has explained the nature and purpose of the operation or other procedure, possible alternative methods of treatment, the risks involved, and the possibility of complication to me.  I acknowledge that no guarantee has been made as to the result that may be obtained.  3.  I recognize that, during the course of this operation, or other procedure, unforseen conditions may necessitate additional or different procedure than those listed above.  I, therefore, further authorize and request that the above named physician, his/her physician assistants or designees perform such procedures as are, in his/her professional opinion, necessary and desirable.  4.  Any tissue or organs removed in the operation or other procedure may be disposed of by and at the discretion of the [facility] staff and [facility].  5.  I understand that in the event of a medical emergency, I will be transported by local paramedics to [facility] or other hospital emergency department.  6.  I certify that I have read and fully understand the above consent to operation and/or other procedure.    7.  I acknowledge that my physician has explained sedation/analgesia administration to me including the risks and benefits.  I consent to the administration of sedation/analgesia as may be necessary or desirable in the judgement of my physician.    Witness signature: ___________________________________________________ Date:  ______/______/_____                    Time:  ________ A.M.  P.M.       Patient Name:  Sarai Drake  6/3/1945  [id number]         Patient signature:   ___________________________________________________                 Statement of Physician  My signature below affirms that prior to the time of the procedure, I have explained to the patient and/or his/her guardian, the risks and benefits involved in the proposed treatment and any reasonable alternative to the proposed treatment.  I have also explained the risks and benefits involved in the refusal of the proposed treatment and have answered the patient's questions.                        Date:  ______/______/_______  Provider                      Signature:  __________________________________________________________       Time:  ___________ AWILMAN FULTON

## (undated) NOTE — LETTER
[facility] [address] , [place]   Date:   10/12/2023     Name:   Werner Cabot    YOB: 1945   MRN:   [id number]       WHERE IS YOUR PAIN NOW? Pelon the areas on your body where you feel the described sensations. Use the appropriate symbol. Anice Cedar the areas of radiation. Include all affected areas. Just to complete the picture, please draw in the face. ACHE:  ^ ^ ^   NUMBNESS:  0000   PINS & NEEDLES:  = = = =                              ^ ^ ^                       0000              = = = =                                    ^ ^ ^                       0000            = = = =      BURNING:  XXXX   STABBING: ////                  XXXX                ////                         XXXX          ////     Please pelon the line below indicating your degree of pain right now  with 0 being no pain 10 being the worst pain possible.                                          0             1             2              3             4              5              6              7             8             9             10         Patient Signature:

## (undated) NOTE — LETTER
WHERE IS YOUR PAIN NOW?  Geri the areas on your body where you feel the described sensations.  Use the appropriate symbol.  Geri the areas of radiation.  Include all affected areas.  Just to complete the picture, please draw in the face.     ACHE:  ^ ^ ^   NUMBNESS:  0000   PINS & NEEDLES:  = = = =                              ^ ^ ^                       0000              = = = =                                    ^ ^ ^                       0000            = = = =      BURNING:  XXXX   STABBING: ////                  XXXX                ////                         XXXX          ////     Please geri the line below indicating your degree of pain right now  with 0 being no pain 10 being the worst pain possible.                                         0             1             2              3             4              5              6              7             8             9             10         Patient Signature:

## (undated) NOTE — LETTER
AUTHORIZATION FOR SURGICAL OPERATION OR OTHER PROCEDURE    1. I hereby authorize Dr. Soy Gloria and the [facility] staff assigned to my case to perform the following operation and/or procedure at the [facility]:  Bilateral knee joint steroid injections under US guidance   2.  My physician has explained the nature and purpose of the operation or other procedure, possible alternative methods of treatment, the risks involved, and the possibility of complication to me.  I acknowledge that no guarantee has been made as to the result that may be obtained.  3.  I recognize that, during the course of this operation, or other procedure, unforseen conditions may necessitate additional or different procedure than those listed above.  I, therefore, further authorize and request that the above named physician, his/her physician assistants or designees perform such procedures as are, in his/her professional opinion, necessary and desirable.  4.  Any tissue or organs removed in the operation or other procedure may be disposed of by and at the discretion of the [facility] staff and [facility].  5.  I understand that in the event of a medical emergency, I will be transported by local paramedics to [facility] or other hospital emergency department.  6.  I certify that I have read and fully understand the above consent to operation and/or other procedure.    7.  I acknowledge that my physician has explained sedation/analgesia administration to me including the risks and benefits.  I consent to the administration of sedation/analgesia as may be necessary or desirable in the judgement of my physician.    Witness signature: ___________________________________________________ Date:  ______/______/_____                    Time:  ________ A.M.  P.M.   Sarai Drake  [id number]  6/3/1945  Patient signature:  ___________________________________________________             Relationship to  Patient:           []  Parent    Responsible person                          []  Spouse  In case of minor or                    [] Other  _____________   Incompetent name:  __________________________________________________                               (please print)      _____________      Responsible person  In case of minor or  Incompetent signature:  _______________________________________________    Statement of Physician  My signature below affirms that prior to the time of the procedure, I have explained to the patient and/or his/her guardian, the risks and benefits involved in the proposed treatment and any reasonable alternative to the proposed treatment.  I have also explained the risks and benefits involved in the refusal of the proposed treatment and have answered the patient's questions.                        Date:  ______/______/_______  Provider                      Signature:  __________________________________________________________       Time:  ___________ A.M    P.M.

## (undated) NOTE — LETTER
A. Notifier: [employer]. Patient Name: Jose M Drake Identification Number: [id number]  Aviso anticipado de no cobertura al beneficiario (ABN, por bobo siglas   en inglés)   NOTA: Si Medicare no paga por los artículos o servicios a continuación, usted podría tener que pagar. Medicare no paga por todo, incluidos algunos cuidados que usted o villanueva proveedor de atención médica entienda que son necesarios. Se anticipa que Medicare no pague por los artículos o servicios a continuación.  D. Artículos o servicios E. Motivo por el cual Medicare podría katelin el pago: F. Costo estimado   Right trochanteric bursa steroid injection under US guidance      __ Medicare no se cubre jose artículo o servicio      __ No se cubre jose artículo o servicio para villanueva condición     __ Es posible que Medicare no pague por jose artículo o servicio con tanta frecuencia        LO QUE USTED DEBE HACER AHORA:  Shahrzad jose aviso para poder emi edwin decisión informada sobre bobo cuidados.  Háganos las preguntas que tenga después de terminar de leer.  Elija edwin opción a continuación sobre si recibirá los artículos o servicios que se indica arriba.       Nota: Si elige Opción 1 o 2, podríamos ayudarle a utilizar los otros seguros que tenga,        ac Medicare no nos puede obligar a hacer esto.  G. Opciones: Sal solamente edwin samantha. No podemos elegir la samantha para usted.    OPCIÓN 1. Quiero los artículos o servicios que se indica arriba. Pudiera pedir el pago ahora, ac yo también solicito que se facture a Medicare para obtener edwin decisión oficial respecto al pago, la cual me será enviada en un Resumen de Medicare (MSN, por bobo siglas en inglés). Entiendo que, si Medicare no paga, yo seré responsable del pago, ac puedo apelar a Medicare según las indicaciones en el MSN. Si Medicare pagará, me serán reembolsados todos los pagos que yo haya hecho, gisselle los copagos o deducibles.   OPCIÓN 2. Quiero los artículos o  servicios que se indica arriba, ac no  facture a Medicare. Se podrá pedir el pago ahora, ya que yo soy responsable del pago. No podré apelar si no se facturara a Medicare.   OPCIÓN 3. No quiero los artículos o servicios que se indica arriba. Entiendo que, con esta elección, no seré responsable del pago, y no podré apelar para saber si Medicare hubiera pagado.    H. Información adicional:   Toshia aviso explica nuestra opinión y no constituye edwin decisión oficial de Medicare. Si usted tiene otras preguntas relativas a toshia aviso o la facturación de Medicare, llame al 1-800-MEDICARE (7-989-633-1963/TTY: 1-877-486-2048). Firme abajo para reconocer kvng recibido y entendido toshia aviso. Usted también recibirá edwin copia.  SHANNA Cain:   TIMOTEO Carter:       Tiene derecho a obtener información de Medicare en un formato accesible, heather letra isabella, braille o audio. También tiene derecho a presentar edwin queja si siente que ha sido discriminado. Visite Medicare.gov/about-us/mcaxkpkndymsj-opkhubmnfxzghrexp-bmpazn.    De acuerdo con la [employer] para la Reducción de Trámites de 1995, ninguna persona será obligada a responder a edwin recopilación de información a menos que se exhiba un número de control válido de la OMB. El número de control válido de la OMB para esta recopilación de información es [phone]. El tiempo necesario para completar esta recopilación de información es de aproximadamente 7 minutos por respuesta, incluido el tiempo para revisar las instrucciones, buscar salazar de datos existentes, reunir los datos necesarios, y completar y revisar la recopilación de información. Si tiene preguntas sobre la precisión del estimado de tiempo o sugerencias para mejorar toshia formulario, escriba a: CMS, [facility]     Security [place], Attn: YAYA Reports Clearance Officer, [place], [place] [address].    Formulario CMS-R-131 (Exp. 01/31/2026)                                      Formulario aprobado [facility] No. [id number]

## (undated) NOTE — LETTER
A. Notifier: [employer]. Patient Name: Sarai Drake Identification Number: [id number]  Aviso anticipado de no cobertura al beneficiario (ABN, por bobo siglas   en inglés)   NOTA: Si Medicare no paga por los artículos o servicios a continuación, usted podría tener que pagar. Medicare no paga por todo, incluidos algunos cuidados que usted o villanueva proveedor de atención médica entienda que son necesarios. Se anticipa que Medicare no pague por los artículos o servicios a continuación.  D. Artículos o servicios  Bilateral knee joint steroid injections under US guidance  E. Motivo por el cual Medicare podría katelin el pago: F. Costo estimado   __ EKG ($87.00)  __ Pap smear ($101) __Pelvic/Breast ($147.00)  __ Ear Irrigation ($138)  _x_ Injection(s)  ___ Tdap ($181)       ___ Meningitis ($290)   __Prevnar ($555)  ___ Td ($66)              ___ Prevnar 20 ($549)  ___ Hep A ($152)     ___ Prolia ($1827)         __ Xiaflex ($         )   ___ Hep B ($150)     ___ Pneumovax($287)                                     ___ Vaccine Administration ($65)   __ Medicare no se cubre jose artículo o servicio      __ No se cubre jose artículo o servicio para villanueva condición     __ Es posible que Medicare no pague por jose artículo o servicio con tanta frecuencia        LO QUE USTED DEBE HACER AHORA:  Shahrzad jose aviso para poder emi edwin decisión informada sobre bobo cuidados.  Háganos las preguntas que tenga después de terminar de leer.  Elija edwin opción a continuación sobre si recibirá los artículos o servicios que se indica arriba.       Nota: Si elige Opción 1 o 2, podríamos ayudarle a utilizar los otros seguros que tenga,        ac Medicare no nos puede obligar a hacer esto.  G. Opciones: Sal solamente edwin samantha. No podemos elegir la samantha para usted.    OPCIÓN 1. Quiero los artículos o servicios que se indica arriba. Pudiera pedir el pago ahora, ac yo también solicito que se facture a Medicare para  obtener edwin decisión oficial respecto al pago, la cual me será enviada en un Resumen de Medicare (MSN, por bobo siglas en inglés). Entiendo que, si Medicare no paga, yo seré responsable del pago, ac puedo apelar a Medicare según las indicaciones en el MSN. Si Medicare pagará, me serán reembolsados todos los pagos que yo haya hecho, gisselle los copagos o deducibles.   OPCIÓN 2. Quiero los artículos o servicios que se indica arriba, ac no  facture a Medicare. Se podrá pedir el pago ahora, ya que yo soy responsable del pago. No podré apelar si no se facturara a Medicare.   OPCIÓN 3. No quiero los artículos o servicios que se indica arriba. Entiendo que, con esta elección, no seré responsable del pago, y no podré apelar para saber si Medicare hubiera pagado.    H. Información adicional:   Jose aviso explica nuestra opinión y no constituye edwin decisión oficial de Medicare. Si usted tiene otras preguntas relativas a jose aviso o la facturación de Medicare, llame al 1-800-MEDICARE ([phone]/TTY: 1-877-486-2048). Firme abajo para reconocer kvng recibido y entendido jose aviso. Usted también recibirá edwin copia.  IUnique Cain:   TIMOTEO Carter:       Tiene derecho a obtener información de Medicare en un formato accesible, heather letra isabella, braille o audio. También tiene derecho a presentar edwin queja si siente que ha sido discriminado. Visite Medicare.gov/about-us/iibfwypxupaht-lowztidsuxixxrohx-pndffk.    De acuerdo con la Beatriz para la Reducción de Trámites de 1995, ninguna persona será obligada a responder a edwin recopilación de información a menos que se exhiba un número de control válido de la OMB. El número de control válido de la OMB para esta recopilación de información es [phone]. El tiempo necesario para completar esta recopilación de información es de aproximadamente 7 minutos por respuesta, incluido el tiempo para revisar las instrucciones, buscar salazar de datos existentes, reunir los datos necesarios, y completar  y revisar la recopilación de información. Si tiene preguntas sobre la precisión del estimado de tiempo o sugerencias para mejorar jose formulario, escriba a: CMS, [facility]     Security [place], Attn: [facility] Reports Clearance Officer, [place], [place] [address].    Formulario CMS-R-131 (Exp. 01/31/2026)                                      Formulario aprobado [facility] No. [id number]

## (undated) NOTE — LETTER
AUTHORIZATION FOR SURGICAL OPERATION OR OTHER PROCEDURE    1. I hereby authorize Dr. Soy Gloria and the [facility] staff assigned to my case to perform the following operation and/or procedure at the [facility]:    Bilateral Knee Corticosteroid Injection under ultrasound    2.  My physician has explained the nature and purpose of the operation or other procedure, possible alternative methods of treatment, the risks involved, and the possibility of complication to me.  I acknowledge that no guarantee has been made as to the result that may be obtained.  3.  I recognize that, during the course of this operation, or other procedure, unforseen conditions may necessitate additional or different procedure than those listed above.  I, therefore, further authorize and request that the above named physician, his/her physician assistants or designees perform such procedures as are, in his/her professional opinion, necessary and desirable.  4.  Any tissue or organs removed in the operation or other procedure may be disposed of by and at the discretion of the [facility] staff and [facility].  5.  I understand that in the event of a medical emergency, I will be transported by local paramedics to [facility] or other hospital emergency department.  6.  I certify that I have read and fully understand the above consent to operation and/or other procedure.    7.  I acknowledge that my physician has explained sedation/analgesia administration to me including the risks and benefits.  I consent to the administration of sedation/analgesia as may be necessary or desirable in the judgement of my physician.    Witness signature: ___________________________________________________ Date:  ______/______/_____                    Time:  ________ A.M.  P.M.       Patient Name:  Jose M Drake  6/3/1945  [id number]       Patient signature:   ___________________________________________________               Statement of Physician  My signature below affirms that prior to the time of the procedure, I have explained to the patient and/or his/her guardian, the risks and benefits involved in the proposed treatment and any reasonable alternative to the proposed treatment.  I have also explained the risks and benefits involved in the refusal of the proposed treatment and have answered the patient's questions.                        Date:  ______/______/_______  Provider                      Signature:  __________________________________________________________       Time:  ___________ AWILMAN FULTON

## (undated) NOTE — LETTER
A. Notifier: [employer]. Patient Name: Sarai Drake Identification Number: [id number]  Aviso anticipado de no cobertura al beneficiario (ABN, por bobo siglas   en inglés)   NOTA: Si Medicare no paga por los artículos o servicios a continuación, usted podría tener que pagar. Medicare no paga por todo, incluidos algunos cuidados que usted o villanueva proveedor de atención médica entienda que son necesarios. Se anticipa que Medicare no pague por los artículos o servicios a continuación.  D. Artículos o servicios  Bilateral knee joint steroid injections under US guidance  E. Motivo por el cual Medicare podría katelin el pago: F. Costo estimado   __ EKG ($87.00)  __ Pap smear ($101) __Pelvic/Breast ($147.00)  __ Ear Irrigation ($138)  _X_ Injection(s)  ___ Tdap ($181)       ___ Meningitis ($290)   __Prevnar ($555)  ___ Td ($66)              ___ Prevnar 20 ($549)  ___ Hep A ($152)     ___ Prolia ($1827)         __ Xiaflex ($         )   ___ Hep B ($150)     ___ Pneumovax($287)                                     ___ Vaccine Administration ($65)   __ Medicare no se cubre jose artículo o servicio      __ No se cubre jose artículo o servicio para villanueva condición     __ Es posible que Medicare no pague por jose artículo o servicio con tanta frecuencia        LO QUE USTED DEBE HACER AHORA:  Shahrzad jose aviso para poder emi edwin decisión informada sobre bobo cuidados.  Háganos las preguntas que tenga después de terminar de leer.  Elija edwin opción a continuación sobre si recibirá los artículos o servicios que se indica arriba.       Nota: Si elige Opción 1 o 2, podríamos ayudarle a utilizar los otros seguros que tenga,        ac Medicare no nos puede obligar a hacer esto.  G. Opciones: Sal solamente edwin samantha. No podemos elegir la samantha para usted.    OPCIÓN 1. Quiero los artículos o servicios que se indica arriba. Pudiera pedir el pago ahora, ac yo también solicito que se facture a Medicare para  obtener edwin decisión oficial respecto al pago, la cual me será enviada en un Resumen de Medicare (MSN, por bobo siglas en inglés). Entiendo que, si Medicare no paga, yo seré responsable del pago, ac puedo apelar a Medicare según las indicaciones en el MSN. Si Medicare pagará, me serán reembolsados todos los pagos que yo haya hecho, gisselle los copagos o deducibles.   OPCIÓN 2. Quiero los artículos o servicios que se indica arriba, ac no  facture a Medicare. Se podrá pedir el pago ahora, ya que yo soy responsable del pago. No podré apelar si no se facturara a Medicare.   OPCIÓN 3. No quiero los artículos o servicios que se indica arriba. Entiendo que, con esta elección, no seré responsable del pago, y no podré apelar para saber si Medicare hubiera pagado.    H. Información adicional:   Jose aviso explica nuestra opinión y no constituye edwin decisión oficial de Medicare. Si usted tiene otras preguntas relativas a jose aviso o la facturación de Medicare, llame al 1-800-MEDICARE ([phone]/TTY: 1-877-486-2048). Firme abajo para reconocer kvng recibido y entendido jose aviso. Usted también recibirá edwin copia.  IUnique Cain:   TIMOTEO Carter:       Tiene derecho a obtener información de Medicare en un formato accesible, heather letra isabella, braille o audio. También tiene derecho a presentar edwin queja si siente que ha sido discriminado. Visite Medicare.gov/about-us/pfzbxlpvrwegp-tfxbyzhqhnhmzphut-rcwfso.    De acuerdo con la [employer] para la Reducción de Trámites de 1995, ninguna persona será obligada a responder a edwin recopilación de información a menos que se exhiba un número de control válido de la OMB. El número de control válido de la OMB para esta recopilación de información es [phone]. El tiempo necesario para completar esta recopilación de información es de aproximadamente 7 minutos por respuesta, incluido el tiempo para revisar las instrucciones, buscar salazar de datos existentes, reunir los datos necesarios, y completar  y revisar la recopilación de información. Si tiene preguntas sobre la precisión del estimado de tiempo o sugerencias para mejorar jose formulario, escriba a: CMS, [facility]     Security [place], Attn: [facility] Reports Clearance Officer, [place], [place] [address].    Formulario CMS-R-131 (Exp. 01/31/2026)                                      Formulario aprobado [facility] No. [id number]

## (undated) NOTE — LETTER
[facility]   Date:   1/30/2024     Name:   Sarai Drake    YOB: 1945   MRN:   [id number]       WHERE IS YOUR PAIN NOW?  Geri the areas on your body where you feel the described sensations.  Use the appropriate symbol.  Geri the areas of radiation.  Include all affected areas.  Just to complete the picture, please draw in the face.     ACHE:  ^ ^ ^   NUMBNESS:  0000   PINS & NEEDLES:  = = = =                              ^ ^ ^                       0000              = = = =                                    ^ ^ ^                       0000            = = = =      BURNING:  XXXX   STABBING: ////                  XXXX                ////                         XXXX          ////     Please geri the line below indicating your degree of pain right now  with 0 being no pain 10 being the worst pain possible.                                         0             1             2              3             4              5              6              7             8             9             10         Patient Signature:

## (undated) NOTE — LETTER
AUTHORIZATION FOR SURGICAL OPERATION OR OTHER PROCEDURE    1. I hereby authorize Dr. Soy Gloria and the [facility] staff assigned to my case to perform the following operation and/or procedure at the [facility]:    Bilateral knee joint steroid injections under US guidance     2.  My physician has explained the nature and purpose of the operation or other procedure, possible alternative methods of treatment, the risks involved, and the possibility of complication to me.  I acknowledge that no guarantee has been made as to the result that may be obtained.  3.  I recognize that, during the course of this operation, or other procedure, unforseen conditions may necessitate additional or different procedure than those listed above.  I, therefore, further authorize and request that the above named physician, his/her physician assistants or designees perform such procedures as are, in his/her professional opinion, necessary and desirable.  4.  Any tissue or organs removed in the operation or other procedure may be disposed of by and at the discretion of the [facility] staff and [facility].  5.  I understand that in the event of a medical emergency, I will be transported by local paramedics to [facility] or other hospital emergency department.  6.  I certify that I have read and fully understand the above consent to operation and/or other procedure.    7.  I acknowledge that my physician has explained sedation/analgesia administration to me including the risks and benefits.  I consent to the administration of sedation/analgesia as may be necessary or desirable in the judgement of my physician.    Witness signature: ___________________________________________________ Date:  ______/______/_____                    Time:  ________ A.M.  P.M.       Patient Name:   Sarai Drake  6/3/1945  [id number]     Patient signature:   ___________________________________________________               Statement of Physician  My signature below affirms that prior to the time of the procedure, I have explained to the patient and/or his/her guardian, the risks and benefits involved in the proposed treatment and any reasonable alternative to the proposed treatment.  I have also explained the risks and benefits involved in the refusal of the proposed treatment and have answered the patient's questions.                        Date:  ______/______/_______  Provider                      Signature:  __________________________________________________________       Time:  ___________ AWILMAN FULTON

## (undated) NOTE — LETTER
AUTHORIZATION FOR SURGICAL OPERATION OR OTHER PROCEDURE    1. I hereby authorize Dr. Soy Gloria and the [facility] staff assigned to my case to perform the following operation and/or procedure at the [facility]:     Bilateral knee joint steroid injections under US guidance       2.  My physician has explained the nature and purpose of the operation or other procedure, possible alternative methods of treatment, the risks involved, and the possibility of complication to me.  I acknowledge that no guarantee has been made as to the result that may be obtained.  3.  I recognize that, during the course of this operation, or other procedure, unforseen conditions may necessitate additional or different procedure than those listed above.  I, therefore, further authorize and request that the above named physician, his/her physician assistants or designees perform such procedures as are, in his/her professional opinion, necessary and desirable.  4.  Any tissue or organs removed in the operation or other procedure may be disposed of by and at the discretion of the [facility] staff and [facility].  5.  I understand that in the event of a medical emergency, I will be transported by local paramedics to [facility] or other hospital emergency department.  6.  I certify that I have read and fully understand the above consent to operation and/or other procedure.    7.  I acknowledge that my physician has explained sedation/analgesia administration to me including the risks and benefits.  I consent to the administration of sedation/analgesia as may be necessary or desirable in the judgement of my physician.    Witness signature: ___________________________________________________ Date:  ______/______/_____                    Time:  ________ A.M.  P.M.       Patient Name:Sarai Drake  6/3/1945  [id number]         Patient signature:   ___________________________________________________    Statement of Physician  My signature below affirms that prior to the time of the procedure, I have explained to the patient and/or his/her guardian, the risks and benefits involved in the proposed treatment and any reasonable alternative to the proposed treatment.  I have also explained the risks and benefits involved in the refusal of the proposed treatment and have answered the patient's questions.                        Date:  ______/______/_______  Provider                      Signature:  __________________________________________________________       Time:  ___________ AWILMAN FULTON

## (undated) NOTE — LETTER
AUTHORIZATION FOR SURGICAL OPERATION OR OTHER PROCEDURE    1. I hereby authorize Dr. Soy Gloria and the [facility] staff assigned to my case to perform the following operation and/or procedure at the [facility]:    Right rhomboid and mid trapezius trigger point injections     2.  My physician has explained the nature and purpose of the operation or other procedure, possible alternative methods of treatment, the risks involved, and the possibility of complication to me.  I acknowledge that no guarantee has been made as to the result that may be obtained.  3.  I recognize that, during the course of this operation, or other procedure, unforseen conditions may necessitate additional or different procedure than those listed above.  I, therefore, further authorize and request that the above named physician, his/her physician assistants or designees perform such procedures as are, in his/her professional opinion, necessary and desirable.  4.  Any tissue or organs removed in the operation or other procedure may be disposed of by and at the discretion of the [facility] staff and [facility].  5.  I understand that in the event of a medical emergency, I will be transported by local paramedics to [facility] or other hospital emergency department.  6.  I certify that I have read and fully understand the above consent to operation and/or other procedure.    7.  I acknowledge that my physician has explained sedation/analgesia administration to me including the risks and benefits.  I consent to the administration of sedation/analgesia as may be necessary or desirable in the judgement of my physician.    Witness signature: ___________________________________________________ Date:  ______/______/_____                    Time:  ________ A.M.  P.M.       Patient Name:  Sarai Drake  6/3/1945  [id number]         Patient signature:   ___________________________________________________                   Statement of Physician  My signature below affirms that prior to the time of the procedure, I have explained to the patient and/or his/her guardian, the risks and benefits involved in the proposed treatment and any reasonable alternative to the proposed treatment.  I have also explained the risks and benefits involved in the refusal of the proposed treatment and have answered the patient's questions.                        Date:  ______/______/_______  Provider                      Signature:  __________________________________________________________       Time:  ___________ AWILMAN FULTON

## (undated) NOTE — LETTER
AUTHORIZATION FOR SURGICAL OPERATION OR OTHER PROCEDURE    1. I hereby authorize Dr. Soy Gloria and the [facility] staff assigned to my case to perform the following operation and/or procedure at the [facility]:    Bilateral knee DUROLANE injections under ultrasound guidance    2.  My physician has explained the nature and purpose of the operation or other procedure, possible alternative methods of treatment, the risks involved, and the possibility of complication to me.  I acknowledge that no guarantee has been made as to the result that may be obtained.  3.  I recognize that, during the course of this operation, or other procedure, unforseen conditions may necessitate additional or different procedure than those listed above.  I, therefore, further authorize and request that the above named physician, his/her physician assistants or designees perform such procedures as are, in his/her professional opinion, necessary and desirable.  4.  Any tissue or organs removed in the operation or other procedure may be disposed of by and at the discretion of the [facility] staff and [facility].  5.  I understand that in the event of a medical emergency, I will be transported by local paramedics to [facility] or other hospital emergency department.  6.  I certify that I have read and fully understand the above consent to operation and/or other procedure.    7.  I acknowledge that my physician has explained sedation/analgesia administration to me including the risks and benefits.  I consent to the administration of sedation/analgesia as may be necessary or desirable in the judgement of my physician.    Witness signature: ___________________________________________________ Date:  ______/______/_____                    Time:  ________ A.M.  P.M.       Patient Name:  Sarai Drake  6/3/1945  [id number]         Patient signature:   ___________________________________________________               Statement of Physician  My signature below affirms that prior to the time of the procedure, I have explained to the patient and/or his/her guardian, the risks and benefits involved in the proposed treatment and any reasonable alternative to the proposed treatment.  I have also explained the risks and benefits involved in the refusal of the proposed treatment and have answered the patient's questions.                        Date:  ______/______/_______  Provider                      Signature:  __________________________________________________________       Time:  ___________ AWILMAN FULTON

## (undated) NOTE — LETTER
Notifier: [employer]       Patient Name: Sarai Drake       Identification Number: [id number]                          Advance Beneficiary Notice of Noncoverage (ABN)   NOTE:  If Medicare doesn’t pay for D. Items/service(s) below, you may have to pay.  Medicare does not pay for everything, even some care that you or your health care provider have good reason to think you need. We expect Medicare may not pay for the D. items/service(s) below.  Items or Services  Bilateral knee DUROLANE injections under ultrasound guidance Reason Medicare May Not Pay: Estimated Cost   __EKG ($129.00)  __Pap smear ($48.23) __Pelvic/Breast ($65.00)  __ Ear Irrigation ($149)  _X_ Injection(s)  ___ Tdap ($70)       ___ Meningitis ($206)   __Prevnar ($285)  ___ Td ($51)            ___Shingrix ($215)        __Prevnar 20 ($309)  ___ Hep A ($156)   ___Prolia ($1827.00)     __ Xiaflex ($              )   ___ Hep B ($167)      __Pneumovax ($155)                                            ___ Vaccine Administration ($31)   __ Medicare does not cover this service      __ Medicare may not pay for this   item/service for your condition     __ Medicare may not pay for this item/service as often as this        WHAT YOU NEED TO DO NOW:  Read this notice, so you can make an informed decision about your care.  Ask us any questions that you may have after you finish reading.  Choose an option below about whether to receive the D. item/service(s)  listed above.  Note: If you choose Option 1 or 2, we may help you to use any other insurance that you might have, but Medicare cannot require us to do this.  OPTIONS: Check only one box.  We cannot choose a box for you.   OPTION 1. I want the D. item/service(s) listed above. You may ask to be paid now, but I also want Medicare billed for an official decision on payment, which is sent to me on a Medicare Summary Notice (MSN). I understand that if Medicare doesn’t pay, I am responsible for payment,  but I can appeal to Medicare by following the directions on the MSN. If Medicare does pay, you will refund any payments I made to you, less co-pays or deductibles.  OPTION 2. I want the D. item/service(s) listed above, but do not bill Medicare. You may ask to be paid now as I am responsible for payment. I cannot appeal if Medicare is not billed.  OPTION 3. I don't want the D. item/service(s) listed above. I understand with this choice I am not responsible for payment, and I cannot appeal to see if Medicare would pay.    H. Additional Information:    This notice gives our opinion, not an official Medicare decision. If you have other questions on this notice or Medicare billing, call 1-800-MEDICARE (1-416.961.9502/TTY: 1-123.909.7556). Signing below means that you have received and understand this notice. You also receive a copy.  Signature: Date:       You have the right to get Medicare information in an accessible format, like large print, Braille, or audio. You also have the right to file a complaint if you feel you’ve been discriminated against. Visit Medicare.gov/about- us/racxqjxrqctoi-cbxmkrvfxnjkhswvj-jnfrau.  According to the Paperwork Reduction Act of 1995, no persons are required to respond to a collection of information unless it displays a valid OMB control number. The valid OMB control number for this information collection is 1064-9900. The time required to complete this information collection is estimated to average 7 minutes per response, including the time to review instructions, search existing data resources, gather the data needed, and complete and review the information collection. If you have comments concerning the accuracy of the time estimate or suggestions for improving this form, please write to: CMS, [facility] Security     Estefany Arringtonn: [employer] Reports Clearance Officer, [place], [place] [address].  Form CMS-R-131 (Exp. 1/31/2026) Form Approved OMB No. [id number]

## (undated) NOTE — LETTER
Date: July 15, 2025      Patient Name: Jose M Drake      : 6/3/1945        Thank you for choosing [facility] as your health care provider. Your physician has deemed the following medical service(s) necessary. However, your insurance plan may not pay for all of your health care and costs and may deny payment for this service. The fact that your insurance plan does not pay for an item or service does not mean you should not receive it. The purpose of this form is to help you make an informed decision about whether or not you want to receive this service(s) that may not be paid for by your insurance plan.    CPT Code Description     Cost     _________ Bilateral Knee Corticosteroid Injection under ultrasound      _____________      I understand that the above mentioned service(s) or supply may not be covered by my insurance company. I agree to be financially responsible for the cost of this service or supply in the event of my insurance denies payment as a non-covered benefit.        ______________________________________________________________________  Signature of Patient or Patient's Representative  Relationship  Date    ______________________________________________________________________  Signature of Witness to signing of form   Printed Name

## (undated) NOTE — LETTER
Date: 2024      Patient Name: Sarai Drake      : 6/3/1945        Thank you for choosing [facility] as your health care provider. Your physician has deemed the following medical service(s) necessary. However, your insurance plan may not pay for all of your health care and costs and may deny payment for this service. The fact that your insurance plan does not pay for an item or service does not mean you should not receive it. The purpose of this form is to help you make an informed decision about whether or not you want to receive this service(s) that may not be paid for by your insurance plan.    CPT Code Description     Cost     _________ Right rhomboid and mid trapezius trigger point injections             _____________      I understand that the above mentioned service(s) or supply may not be covered by my insurance company. I agree to be financially responsible for the cost of this service or supply in the event of my insurance denies payment as a non-covered benefit.        ______________________________________________________________________  Signature of Patient or Patient's Representative  Relationship  Date    ______________________________________________________________________  Signature of Witness to signing of form   Printed Name

## (undated) NOTE — LETTER
A. Notifier: [employer]. Patient Name: Sarai Drake Identification Number: [id number]  Aviso anticipado de no cobertura al beneficiario (ABN, por bobo siglas   en inglés)   NOTA: Si Medicare no paga por los artículos o servicios a continuación, usted podría tener que pagar. Medicare no paga por todo, incluidos algunos cuidados que usted o villanueva proveedor de atención médica entienda que son necesarios. Se anticipa que Medicare no pague por los artículos o servicios a continuación.  D. Artículos o servicios  Bilateral knee joint steroid injections under US guidance  E. Motivo por el cual Medicare podría katelin el pago: F. Costo estimado   __ EKG ($87.00)  __ Pap smear ($101) __Pelvic/Breast ($147.00)  __ Ear Irrigation ($138)  _x_ Injection(s)  ___ Tdap ($181)       ___ Meningitis ($290)   __Prevnar ($555)  ___ Td ($66)              ___ Prevnar 20 ($549)  ___ Hep A ($152)     ___ Prolia ($1827)         __ Xiaflex ($         )   ___ Hep B ($150)     ___ Pneumovax($287)                                     ___ Vaccine Administration ($65)   __ Medicare no se cubre jose artículo o servicio      __ No se cubre jose artículo o servicio para villanueva condición     __ Es posible que Medicare no pague por jose artículo o servicio con tanta frecuencia        LO QUE USTED DEBE HACER AHORA:  Shahrzad jose aviso para poder emi edwin decisión informada sobre bobo cuidados.  Háganos las preguntas que tenga después de terminar de leer.  Elija edwin opción a continuación sobre si recibirá los artículos o servicios que se indica arriba.       Nota: Si elige Opción 1 o 2, podríamos ayudarle a utilizar los otros seguros que tenga,        ac Medicare no nos puede obligar a hacer esto.  G. Opciones: Sal solamente edwin samantha. No podemos elegir la samantha para usted.    OPCIÓN 1. Quiero los artículos o servicios que se indica arriba. Pudiera pedir el pago ahora, ac yo también solicito que se facture a Medicare para  obtener edwin decisión oficial respecto al pago, la cual me será enviada en un Resumen de Medicare (MSN, por bobo siglas en inglés). Entiendo que, si Medicare no paga, yo seré responsable del pago, ac puedo apelar a Medicare según las indicaciones en el MSN. Si Medicare pagará, me serán reembolsados todos los pagos que yo haya hecho, gisselle los copagos o deducibles.   OPCIÓN 2. Quiero los artículos o servicios que se indica arriba, ac no  facture a Medicare. Se podrá pedir el pago ahora, ya que yo soy responsable del pago. No podré apelar si no se facturara a Medicare.   OPCIÓN 3. No quiero los artículos o servicios que se indica arriba. Entiendo que, con esta elección, no seré responsable del pago, y no podré apelar para saber si Medicare hubiera pagado.    H. Información adicional:   Jose aviso explica nuestra opinión y no constituye edwin decisión oficial de Medicare. Si usted tiene otras preguntas relativas a jose aviso o la facturación de Medicare, llame al 1-800-MEDICARE (3-642-883-3648/TTY: 1-877-486-2048). Firme abajo para reconocer kvng recibido y entendido jose aviso. Usted también recibirá edwin copia.  IUnique Cain:   TIMOTEO Carter:       Tiene derecho a obtener información de Medicare en un formato accesible, heather letra isabella, braille o audio. También tiene derecho a presentar edwin queja si siente que ha sido discriminado. Visite Medicare.gov/about-us/hlbpzzwbzobzw-srzaxujfalnitvlro-odqvqc.    De acuerdo con la Beatriz para la Reducción de Trámites de 1995, ninguna persona será obligada a responder a edwin recopilación de información a menos que se exhiba un número de control válido de la OMB. El número de control válido de la OMB para esta recopilación de información es [phone]. El tiempo necesario para completar esta recopilación de información es de aproximadamente 7 minutos por respuesta, incluido el tiempo para revisar las instrucciones, buscar salazar de datos existentes, reunir los datos necesarios, y completar  y revisar la recopilación de información. Si tiene preguntas sobre la precisión del estimado de tiempo o sugerencias para mejorar jose formulario, escriba a: CMS, [facility]     Security [place], Attn: [facility] Reports Clearance Officer, [place], [place] [address].    Formulario CMS-R-131 (Exp. 01/31/2026)                                      Formulario aprobado [facility] No. [id number]

## (undated) NOTE — LETTER
[facility]   Date:   2/15/2024     Name:   Sarai Drake    YOB: 1945   MRN:   [id number]       WHERE IS YOUR PAIN NOW?  Geri the areas on your body where you feel the described sensations.  Use the appropriate symbol.  Geri the areas of radiation.  Include all affected areas.  Just to complete the picture, please draw in the face.     ACHE:  ^ ^ ^   NUMBNESS:  0000   PINS & NEEDLES:  = = = =                              ^ ^ ^                       0000              = = = =                                    ^ ^ ^                       0000            = = = =      BURNING:  XXXX   STABBING: ////                  XXXX                ////                         XXXX          ////     Please geri the line below indicating your degree of pain right now  with 0 being no pain 10 being the worst pain possible.                                         0             1             2              3             4              5              6              7             8             9             10         Patient Signature: